# Patient Record
Sex: MALE | Race: WHITE | Employment: UNEMPLOYED | URBAN - METROPOLITAN AREA
[De-identification: names, ages, dates, MRNs, and addresses within clinical notes are randomized per-mention and may not be internally consistent; named-entity substitution may affect disease eponyms.]

---

## 2022-11-20 ENCOUNTER — HOSPITAL ENCOUNTER (INPATIENT)
Age: 77
LOS: 2 days | Discharge: HOME | DRG: 726 | End: 2022-11-22
Attending: INTERNAL MEDICINE | Admitting: INTERNAL MEDICINE
Payer: MEDICARE

## 2022-11-20 ENCOUNTER — APPOINTMENT (OUTPATIENT)
Dept: GENERAL RADIOLOGY | Age: 77
DRG: 726 | End: 2022-11-20
Payer: MEDICARE

## 2022-11-20 DIAGNOSIS — E87.1 HYPONATREMIA: ICD-10-CM

## 2022-11-20 DIAGNOSIS — N17.9 AKI (ACUTE KIDNEY INJURY) (HCC): Primary | ICD-10-CM

## 2022-11-20 DIAGNOSIS — R33.8 URINARY RETENTION DUE TO BENIGN PROSTATIC HYPERPLASIA: ICD-10-CM

## 2022-11-20 DIAGNOSIS — D72.829 LEUKOCYTOSIS, UNSPECIFIED TYPE: ICD-10-CM

## 2022-11-20 DIAGNOSIS — N40.1 URINARY RETENTION DUE TO BENIGN PROSTATIC HYPERPLASIA: ICD-10-CM

## 2022-11-20 DIAGNOSIS — N30.01 ACUTE CYSTITIS WITH HEMATURIA: ICD-10-CM

## 2022-11-20 PROBLEM — N39.0 UTI (URINARY TRACT INFECTION): Status: ACTIVE | Noted: 2022-11-20

## 2022-11-20 LAB
ANION GAP SERPL CALCULATED.3IONS-SCNC: 12 MMOL/L (ref 3–16)
BACTERIA: ABNORMAL /HPF
BASOPHILS ABSOLUTE: 0 K/UL (ref 0–0.2)
BASOPHILS RELATIVE PERCENT: 0 %
BILIRUBIN URINE: NEGATIVE
BLOOD, URINE: ABNORMAL
BUN BLDV-MCNC: 41 MG/DL (ref 7–20)
CALCIUM SERPL-MCNC: 8.7 MG/DL (ref 8.3–10.6)
CHLORIDE BLD-SCNC: 94 MMOL/L (ref 99–110)
CHLORIDE URINE RANDOM: 33 MMOL/L
CLARITY: CLEAR
CO2: 22 MMOL/L (ref 21–32)
COLOR: YELLOW
CREAT SERPL-MCNC: 2.7 MG/DL (ref 0.8–1.3)
EOSINOPHILS ABSOLUTE: 0 K/UL (ref 0–0.6)
EOSINOPHILS RELATIVE PERCENT: 0 %
EPITHELIAL CELLS, UA: 2 /HPF (ref 0–5)
GFR SERPL CREATININE-BSD FRML MDRD: 23 ML/MIN/{1.73_M2}
GLUCOSE BLD-MCNC: 101 MG/DL (ref 70–99)
GLUCOSE URINE: NEGATIVE MG/DL
HCT VFR BLD CALC: 37 % (ref 40.5–52.5)
HEMOGLOBIN: 12.1 G/DL (ref 13.5–17.5)
HYALINE CASTS: 1 /LPF (ref 0–8)
KETONES, URINE: NEGATIVE MG/DL
LEUKOCYTE ESTERASE, URINE: ABNORMAL
LYMPHOCYTES ABSOLUTE: 2 K/UL (ref 1–5.1)
LYMPHOCYTES RELATIVE PERCENT: 13 %
MCH RBC QN AUTO: 29 PG (ref 26–34)
MCHC RBC AUTO-ENTMCNC: 32.9 G/DL (ref 31–36)
MCV RBC AUTO: 88.2 FL (ref 80–100)
MICROSCOPIC EXAMINATION: YES
MONOCYTES ABSOLUTE: 1.2 K/UL (ref 0–1.3)
MONOCYTES RELATIVE PERCENT: 8 %
NEUTROPHILS ABSOLUTE: 12.2 K/UL (ref 1.7–7.7)
NEUTROPHILS RELATIVE PERCENT: 79 %
NITRITE, URINE: NEGATIVE
PDW BLD-RTO: 14.6 % (ref 12.4–15.4)
PH UA: 5.5 (ref 5–8)
PLATELET # BLD: 324 K/UL (ref 135–450)
PMV BLD AUTO: 7.6 FL (ref 5–10.5)
POTASSIUM REFLEX MAGNESIUM: 4.3 MMOL/L (ref 3.5–5.1)
POTASSIUM, UR: 32.8 MMOL/L
PROTEIN UA: NEGATIVE MG/DL
RBC # BLD: 4.19 M/UL (ref 4.2–5.9)
RBC # BLD: NORMAL 10*6/UL
RBC UA: 14 /HPF (ref 0–4)
SODIUM BLD-SCNC: 128 MMOL/L (ref 136–145)
SODIUM URINE: 44 MMOL/L
SPECIFIC GRAVITY UA: 1.01 (ref 1–1.03)
URINE REFLEX TO CULTURE: YES
URINE TYPE: ABNORMAL
UROBILINOGEN, URINE: 0.2 E.U./DL
WBC # BLD: 15.5 K/UL (ref 4–11)
WBC UA: 15 /HPF (ref 0–5)

## 2022-11-20 PROCEDURE — 6360000002 HC RX W HCPCS: Performed by: INTERNAL MEDICINE

## 2022-11-20 PROCEDURE — 87040 BLOOD CULTURE FOR BACTERIA: CPT

## 2022-11-20 PROCEDURE — 82436 ASSAY OF URINE CHLORIDE: CPT

## 2022-11-20 PROCEDURE — 83935 ASSAY OF URINE OSMOLALITY: CPT

## 2022-11-20 PROCEDURE — 51798 US URINE CAPACITY MEASURE: CPT

## 2022-11-20 PROCEDURE — 80048 BASIC METABOLIC PNL TOTAL CA: CPT

## 2022-11-20 PROCEDURE — 74018 RADEX ABDOMEN 1 VIEW: CPT

## 2022-11-20 PROCEDURE — 99285 EMERGENCY DEPT VISIT HI MDM: CPT

## 2022-11-20 PROCEDURE — 6360000002 HC RX W HCPCS: Performed by: PHYSICIAN ASSISTANT

## 2022-11-20 PROCEDURE — 2580000003 HC RX 258: Performed by: PHYSICIAN ASSISTANT

## 2022-11-20 PROCEDURE — 84133 ASSAY OF URINE POTASSIUM: CPT

## 2022-11-20 PROCEDURE — 36415 COLL VENOUS BLD VENIPUNCTURE: CPT

## 2022-11-20 PROCEDURE — 87086 URINE CULTURE/COLONY COUNT: CPT

## 2022-11-20 PROCEDURE — 81001 URINALYSIS AUTO W/SCOPE: CPT

## 2022-11-20 PROCEDURE — 84300 ASSAY OF URINE SODIUM: CPT

## 2022-11-20 PROCEDURE — 85025 COMPLETE CBC W/AUTO DIFF WBC: CPT

## 2022-11-20 PROCEDURE — 51702 INSERT TEMP BLADDER CATH: CPT

## 2022-11-20 PROCEDURE — 1200000000 HC SEMI PRIVATE

## 2022-11-20 PROCEDURE — 6370000000 HC RX 637 (ALT 250 FOR IP): Performed by: INTERNAL MEDICINE

## 2022-11-20 RX ORDER — LISINOPRIL 10 MG/1
10 TABLET ORAL EVERY EVENING
COMMUNITY

## 2022-11-20 RX ORDER — TAMSULOSIN HYDROCHLORIDE 0.4 MG/1
0.4 CAPSULE ORAL EVERY EVENING
COMMUNITY

## 2022-11-20 RX ORDER — ASPIRIN 81 MG/1
81 TABLET ORAL NIGHTLY
Status: DISCONTINUED | OUTPATIENT
Start: 2022-11-20 | End: 2022-11-22 | Stop reason: HOSPADM

## 2022-11-20 RX ORDER — ATORVASTATIN CALCIUM 40 MG/1
40 TABLET, FILM COATED ORAL EVERY EVENING
COMMUNITY

## 2022-11-20 RX ORDER — TIZANIDINE 4 MG/1
4 TABLET ORAL EVERY 8 HOURS PRN
COMMUNITY

## 2022-11-20 RX ORDER — GABAPENTIN 100 MG/1
100 CAPSULE ORAL 2 TIMES DAILY
COMMUNITY

## 2022-11-20 RX ORDER — TAMSULOSIN HYDROCHLORIDE 0.4 MG/1
0.4 CAPSULE ORAL NIGHTLY
Status: DISCONTINUED | OUTPATIENT
Start: 2022-11-21 | End: 2022-11-22 | Stop reason: HOSPADM

## 2022-11-20 RX ORDER — ACETAMINOPHEN 325 MG/1
650 TABLET ORAL EVERY 6 HOURS PRN
Status: DISCONTINUED | OUTPATIENT
Start: 2022-11-20 | End: 2022-11-22 | Stop reason: HOSPADM

## 2022-11-20 RX ORDER — GABAPENTIN 100 MG/1
100 CAPSULE ORAL 2 TIMES DAILY
Status: DISCONTINUED | OUTPATIENT
Start: 2022-11-20 | End: 2022-11-22 | Stop reason: HOSPADM

## 2022-11-20 RX ORDER — ONDANSETRON 2 MG/ML
4 INJECTION INTRAMUSCULAR; INTRAVENOUS EVERY 6 HOURS PRN
Status: DISCONTINUED | OUTPATIENT
Start: 2022-11-20 | End: 2022-11-22 | Stop reason: HOSPADM

## 2022-11-20 RX ORDER — CAL/D3/MAG11/ZINC/COP/MANG/BOR 600 MG-800
1 TABLET ORAL DAILY
COMMUNITY

## 2022-11-20 RX ORDER — OMEPRAZOLE 40 MG/1
40 CAPSULE, DELAYED RELEASE ORAL DAILY
COMMUNITY

## 2022-11-20 RX ORDER — SODIUM CHLORIDE 0.9 % (FLUSH) 0.9 %
5-40 SYRINGE (ML) INJECTION EVERY 12 HOURS SCHEDULED
Status: DISCONTINUED | OUTPATIENT
Start: 2022-11-20 | End: 2022-11-22 | Stop reason: HOSPADM

## 2022-11-20 RX ORDER — SODIUM CHLORIDE 0.9 % (FLUSH) 0.9 %
5-40 SYRINGE (ML) INJECTION PRN
Status: DISCONTINUED | OUTPATIENT
Start: 2022-11-20 | End: 2022-11-22 | Stop reason: HOSPADM

## 2022-11-20 RX ORDER — TROSPIUM CHLORIDE 20 MG/1
20 TABLET, FILM COATED ORAL NIGHTLY
Status: DISCONTINUED | OUTPATIENT
Start: 2022-11-21 | End: 2022-11-21

## 2022-11-20 RX ORDER — SODIUM CHLORIDE 9 MG/ML
INJECTION, SOLUTION INTRAVENOUS PRN
Status: DISCONTINUED | OUTPATIENT
Start: 2022-11-20 | End: 2022-11-22 | Stop reason: HOSPADM

## 2022-11-20 RX ORDER — ENOXAPARIN SODIUM 100 MG/ML
30 INJECTION SUBCUTANEOUS NIGHTLY
Status: DISCONTINUED | OUTPATIENT
Start: 2022-11-20 | End: 2022-11-21

## 2022-11-20 RX ORDER — SODIUM CHLORIDE 9 MG/ML
INJECTION, SOLUTION INTRAVENOUS CONTINUOUS
Status: DISCONTINUED | OUTPATIENT
Start: 2022-11-20 | End: 2022-11-22 | Stop reason: HOSPADM

## 2022-11-20 RX ORDER — PANTOPRAZOLE SODIUM 40 MG/1
40 TABLET, DELAYED RELEASE ORAL
Status: DISCONTINUED | OUTPATIENT
Start: 2022-11-21 | End: 2022-11-22 | Stop reason: HOSPADM

## 2022-11-20 RX ORDER — SOLIFENACIN SUCCINATE 5 MG/1
5 TABLET, FILM COATED ORAL EVERY EVENING
COMMUNITY

## 2022-11-20 RX ORDER — POLYETHYLENE GLYCOL 3350 17 G/17G
17 POWDER, FOR SOLUTION ORAL DAILY PRN
Status: DISCONTINUED | OUTPATIENT
Start: 2022-11-20 | End: 2022-11-22 | Stop reason: HOSPADM

## 2022-11-20 RX ORDER — ACETAMINOPHEN 650 MG/1
650 SUPPOSITORY RECTAL EVERY 6 HOURS PRN
Status: DISCONTINUED | OUTPATIENT
Start: 2022-11-20 | End: 2022-11-22 | Stop reason: HOSPADM

## 2022-11-20 RX ORDER — ASPIRIN 81 MG/1
81 TABLET ORAL EVERY EVENING
COMMUNITY

## 2022-11-20 RX ORDER — TROSPIUM CHLORIDE 20 MG/1
20 TABLET, FILM COATED ORAL
Status: DISCONTINUED | OUTPATIENT
Start: 2022-11-20 | End: 2022-11-20 | Stop reason: DRUGHIGH

## 2022-11-20 RX ORDER — POLYETHYLENE GLYCOL 3350 17 G/17G
17 POWDER, FOR SOLUTION ORAL 2 TIMES DAILY
Status: DISCONTINUED | OUTPATIENT
Start: 2022-11-20 | End: 2022-11-22 | Stop reason: HOSPADM

## 2022-11-20 RX ORDER — METOPROLOL TARTRATE 50 MG/1
50 TABLET, FILM COATED ORAL 2 TIMES DAILY
COMMUNITY

## 2022-11-20 RX ORDER — ATORVASTATIN CALCIUM 40 MG/1
40 TABLET, FILM COATED ORAL NIGHTLY
Status: DISCONTINUED | OUTPATIENT
Start: 2022-11-20 | End: 2022-11-22 | Stop reason: HOSPADM

## 2022-11-20 RX ORDER — METOPROLOL TARTRATE 50 MG/1
50 TABLET, FILM COATED ORAL 2 TIMES DAILY
Status: DISCONTINUED | OUTPATIENT
Start: 2022-11-20 | End: 2022-11-22 | Stop reason: HOSPADM

## 2022-11-20 RX ORDER — ONDANSETRON 4 MG/1
4 TABLET, ORALLY DISINTEGRATING ORAL EVERY 8 HOURS PRN
Status: DISCONTINUED | OUTPATIENT
Start: 2022-11-20 | End: 2022-11-22 | Stop reason: HOSPADM

## 2022-11-20 RX ORDER — 0.9 % SODIUM CHLORIDE 0.9 %
1000 INTRAVENOUS SOLUTION INTRAVENOUS ONCE
Status: COMPLETED | OUTPATIENT
Start: 2022-11-20 | End: 2022-11-20

## 2022-11-20 RX ADMIN — POLYETHYLENE GLYCOL 3350 17 G: 17 POWDER, FOR SOLUTION ORAL at 20:06

## 2022-11-20 RX ADMIN — METOPROLOL TARTRATE 50 MG: 50 TABLET, FILM COATED ORAL at 20:06

## 2022-11-20 RX ADMIN — ASPIRIN 81 MG: 81 TABLET, COATED ORAL at 20:06

## 2022-11-20 RX ADMIN — ENOXAPARIN SODIUM 30 MG: 100 INJECTION SUBCUTANEOUS at 20:06

## 2022-11-20 RX ADMIN — GABAPENTIN 100 MG: 100 CAPSULE ORAL at 20:06

## 2022-11-20 RX ADMIN — ATORVASTATIN CALCIUM 40 MG: 40 TABLET, FILM COATED ORAL at 20:06

## 2022-11-20 RX ADMIN — CEFTRIAXONE 1000 MG: 1 INJECTION, POWDER, FOR SOLUTION INTRAMUSCULAR; INTRAVENOUS at 14:52

## 2022-11-20 RX ADMIN — SODIUM CHLORIDE 1000 ML: 9 INJECTION, SOLUTION INTRAVENOUS at 14:53

## 2022-11-20 ASSESSMENT — PAIN - FUNCTIONAL ASSESSMENT: PAIN_FUNCTIONAL_ASSESSMENT: 0-10

## 2022-11-20 ASSESSMENT — PAIN SCALES - GENERAL: PAINLEVEL_OUTOF10: 3

## 2022-11-20 NOTE — PROGRESS NOTES
Clinical Pharmacy Note  Renal Dose Adjustment    Viola Grace is receiving trospium. This medication is renally eliminated. Based on the patient's Estimated Creatinine Clearance of 21 mL/min (A) (based on SCr of 2.7 mg/dL (H)), the dose has been adjusted to trospium 20 mg nightly per protocol. Pharmacy will continue to monitor and adjust dose as needed for changes in renal function.

## 2022-11-20 NOTE — PROGRESS NOTES
Pharmacy Medication Reconciliation Note     List of medications Kylah John is currently taking is complete. Source of information:   1. Conversation with patient and spouse at bedside  2. EMR    Notes regarding home medications:   1.  Patient reports taking all AM meds and even QPM Vesicare and Flomax PTA in the ED    Patient denies taking any additional OTC or herbal medications    Ned Torres, Pharmacy Intern  11/20/2022  3:37 PM

## 2022-11-20 NOTE — H&P
Hospital Medicine History & Physical      PCP: Elvie Mckenna    Date of Admission: 11/20/2022    Date of Service: Pt seen/examined on 11/20/2022   and Admitted to Inpatient with expected LOS greater than two midnights due to medical therapy. Chief Complaint: Urinary retention      History Of Present Illness:      76 y.o. male who presented to [facility] [place] with urinary retention. Patient states he has had difficulty urinating for the last several days and today he had abdominal discomfort and constipation. Pain was mostly suprapubic and cramping. Patient is on medical therapy for BPH. In the ER did bladder scan was found to have over 900 mL of urine and he required placement of Flores catheter. He is feeling much better after Flores placement. He denies fevers or chills. He states that he has been constipated several days ago and it has not really improved much. He feels it was secondary to antibiotics which she received for a upper respiratory tract infection    Past Medical History:      Hyperlipidemia  Peripheral neuropathy  BPH  Gastroesophageal reflux disease    Past Surgical History:      History reviewed. No pertinent surgical history. Medications Prior to Admission:      Prior to Admission medications    Medication Sig Start Date End Date Taking? Authorizing Provider   atorvastatin (LIPITOR) 40 MG tablet Take 40 mg by mouth every evening   Yes Historical Provider, MD   gabapentin (NEURONTIN) 100 MG capsule Take 100 mg by mouth in the morning and at bedtime.    Yes Historical Provider, MD   lisinopril (PRINIVIL;ZESTRIL) 10 MG tablet Take 10 mg by mouth every evening   Yes Historical Provider, MD   metoprolol tartrate (LOPRESSOR) 50 MG tablet Take 50 mg by mouth 2 times daily   Yes Historical Provider, MD   omeprazole (PRILOSEC) 40 MG delayed release capsule Take 40 mg by mouth daily   Yes Historical Provider, MD   solifenacin (VESICARE) 5 MG tablet Take 5 mg by mouth every evening   Yes Historical Provider, MD   tamsulosin (FLOMAX) 0.4 MG capsule Take 0.4 mg by mouth every evening   Yes Historical Provider, MD   tiZANidine (ZANAFLEX) 4 MG tablet Take 4 mg by mouth every 8 hours as needed   Yes Historical Provider, MD   Caltrate 600+D Plus Minerals (CALTRATE) 600-800 MG-UNIT TABS tablet Take 1 tablet by mouth daily   Yes Historical Provider, MD   aspirin 81 MG EC tablet Take 81 mg by mouth every evening   Yes Historical Provider, MD       Allergies:  Patient has no known allergies. Social History:      The patient currently lives with his wife    TOBACCO:   reports that he has never smoked. He has never used smokeless tobacco.  ETOH:   reports that he does not currently use alcohol. E-cigarette/Vaping       Questions Responses    E-cigarette/Vaping Use     Start Date     Passive Exposure     Quit Date     Counseling Given     Comments               Family History:       Reviewed and negative in regards to presenting illness/complaint. History reviewed. No pertinent family history. REVIEW OF SYSTEMS COMPLETED:   Pertinent positives as noted in the HPI. All other systems reviewed and negative. PHYSICAL EXAM PERFORMED:    BP (!) 143/74   Pulse 57   Temp 98.7 °F (37.1 °C)   Resp 16   Ht 5' 8\" (1.727 m)   Wt 168 lb 6.9 oz (76.4 kg)   SpO2 94%   BMI 25.61 kg/m²     General appearance:  No apparent distress, appears stated age and cooperative. HEENT:  Normal cephalic, atraumatic without obvious deformity. Pupils equal, round, and reactive to light. Extra ocular muscles intact. Conjunctivae/corneas clear. Neck: Supple, with full range of motion. No jugular venous distention. Trachea midline. Respiratory:  Normal respiratory effort. Clear to auscultation, bilaterally without Rales/Wheezes/Rhonchi. Cardiovascular:  Regular rate and rhythm with normal S1/S2 without murmurs, rubs or gallops.   Abdomen: Soft, non-tender, non-distended with normal bowel sounds. Musculoskeletal:  No clubbing, cyanosis or edema bilaterally. Full range of motion without deformity. Skin: Skin color, texture, turgor normal.  No rashes or lesions. Neurologic:  Neurovascularly intact without any focal sensory/motor deficits. Cranial nerves: II-XII intact, grossly non-focal.  Psychiatric:  Alert and oriented, thought content appropriate, normal insight  Capillary Refill: Brisk,3 seconds, normal  Peripheral Pulses: +2 palpable, equal bilaterally       Labs:     Recent Labs     11/20/22  1304   WBC 15.5*   HGB 12.1*   HCT 37.0*        Recent Labs     11/20/22  1304   *   K 4.3   CL 94*   CO2 22   BUN 41*   CREATININE 2.7*   CALCIUM 8.7     No results for input(s): AST, ALT, BILIDIR, BILITOT, ALKPHOS in the last 72 hours. No results for input(s): INR in the last 72 hours. No results for input(s): Peng Arlene in the last 72 hours. Urinalysis:      Lab Results   Component Value Date/Time    NITRU Negative 11/20/2022 01:04 PM    WBCUA 15 11/20/2022 01:04 PM    BACTERIA None Seen 11/20/2022 01:04 PM    RBCUA 14 11/20/2022 01:04 PM    BLOODU MODERATE 11/20/2022 01:04 PM    SPECGRAV 1.012 11/20/2022 01:04 PM    GLUCOSEU Negative 11/20/2022 01:04 PM       Radiology:       XR ABDOMEN (KUB) (SINGLE AP VIEW)   Final Result   Nonobstructive bowel gas pattern without significant stool volume.              Consults:    IP CONSULT TO HOSPITALIST  IP CONSULT TO UROLOGY    ASSESSMENT:    Acute urinary retention  Urinary tract obstruction due to BPH status post Flores placed  Urinary tract infection  Acute renal failure likely obstructive  Constipation  Hypertension  GERD    PLAN:    Flores catheter placed  Treat with Flomax  Gentle IV fluids normal saline at 75 MLS per hour  ACE inhibitor held  Rocephin for UTI  Neurology consult  Treat constipation with MiraLAX 17 g twice daily and Senokot  Continue Prilosec for GERD  Continue Lopressor for hypertension  Urology consult  Prosper Echols  DVT Prophylaxis: Lovenox  Diet: ADULT DIET; Regular  Code Status: Full Code    PT/OT Eval Status:     Dispo -2 to 3 days       Chin Higgins MD    Thank you Rodney Downey for the opportunity to be involved in this patient's care. If you have any questions or concerns please feel free to contact me at [phone].

## 2022-11-20 NOTE — ED PROVIDER NOTES
[facility]        Pt Name: Lisa Madison  MRN: [id number]  [place] 1945  Date of evaluation: 11/20/2022  Provider: Mackenzie Robledo PA-C  PCP: Elvie Mcknena  Note Started: 12:50 PM EST 11/20/2022        PK. I have evaluated this patient. My supervising physician was available for consultation. CHIEF COMPLAINT       Chief Complaint   Patient presents with    Urinary Retention     Pt states that since Saturday he has been having trouble urinating he only dribbles and that not normal for him    Abdominal Pain     Pt states that he has been having lower abd pain with nausea        HISTORY OF PRESENT ILLNESS   (Location, Timing/Onset, Context/Setting, Quality, Duration, Modifying Factors, Severity, Associated Signs and Symptoms)  Note limiting factors. Chief Complaint: Dribbling unable to urinate    Lisa Madison is a 76 y.o. male who presents to the emergency department with wife. Patient contacted  Dr. Erica Fontana office. Recommend ED evaluation. The patient reports over the past 2 to 3 days urine dribbling and not actually voiding. Suprapubic discomfort. Concern constipation and lower abdominal pain. Patient reports not having it previously. Patient with BPH. Patient is on Flomax 0.4 nightly. Will advance to a twice daily dosing. Also on Vesicare 5 mg. Nursing Notes were all reviewed and agreed with or any disagreements were addressed in the HPI. REVIEW OF SYSTEMS    (2-9 systems for level 4, 10 or more for level 5)     Review of Systems    Positives and Pertinent negatives as per HPI. Except as noted above in the ROS, all other systems were reviewed and negative. PAST MEDICAL HISTORY   History reviewed. No pertinent past medical history. SURGICAL HISTORY   History reviewed. No pertinent surgical history.       CURRENTMEDICATIONS       Previous Medications    No medications on file ALLERGIES     Patient has no known allergies. FAMILYHISTORY     History reviewed. No pertinent family history. SOCIAL HISTORY       Social History     Tobacco Use    Smoking status: Never    Smokeless tobacco: Never   Substance Use Topics    Alcohol use: Not Currently    Drug use: Never       SCREENINGS             PHYSICAL EXAM    (up to 7 for level 4, 8 or more for level 5)     ED Triage Vitals   BP Temp Temp src Heart Rate Resp SpO2 Height Weight   11/20/22 1233 11/20/22 1237 -- 11/20/22 1233 11/20/22 1237 11/20/22 1237 11/20/22 1233 11/20/22 1233   (!) 97/55 98.7 °F (37.1 °C)  64 18 94 % 5' 8\" (1.727 m) 168 lb 6.9 oz (76.4 kg)       Physical Exam  Vitals and nursing note reviewed. Constitutional:       Appearance: Normal appearance. He is well-developed and normal weight. Comments: Patient mildly hypotensive of with BP 98/60. Could be related to the abdominal pain due to urinary outlet obstruction. Will reevaluate   HENT:      Head: Normocephalic and atraumatic. Right Ear: External ear normal.      Left Ear: External ear normal.   Eyes:      General: No scleral icterus. Right eye: No discharge. Left eye: No discharge. Conjunctiva/sclera: Conjunctivae normal.   Cardiovascular:      Rate and Rhythm: Normal rate and regular rhythm. Heart sounds: Normal heart sounds. Pulmonary:      Effort: Pulmonary effort is normal.      Breath sounds: Normal breath sounds. Abdominal:      General: Abdomen is flat. Bowel sounds are normal.      Palpations: Abdomen is soft. Tenderness: There is abdominal tenderness. Comments: The patient suprapubic tenderness. Bladder scan revealing excess 900 mL urine. Musculoskeletal:         General: Normal range of motion. Cervical back: Normal range of motion and neck supple. Right lower leg: No edema. Left lower leg: No edema. Skin:     General: Skin is warm and dry.    Neurological:      General: No focal deficit present. Mental Status: He is alert and oriented to person, place, and time. Mental status is at baseline. Psychiatric:         Mood and Affect: Mood normal.         Behavior: Behavior normal.         Thought Content: Thought content normal.         Judgment: Judgment normal.       DIAGNOSTIC RESULTS   LABS:    Labs Reviewed   URINALYSIS WITH REFLEX TO CULTURE - Abnormal; Notable for the following components:       Result Value    Blood, Urine MODERATE (*)     Leukocyte Esterase, Urine SMALL (*)     All other components within normal limits   CBC WITH AUTO DIFFERENTIAL - Abnormal; Notable for the following components:    WBC 15.5 (*)     RBC 4.19 (*)     Hemoglobin 12.1 (*)     Hematocrit 37.0 (*)     All other components within normal limits   BASIC METABOLIC PANEL W/ REFLEX TO MG FOR LOW K - Abnormal; Notable for the following components:    Sodium 128 (*)     Chloride 94 (*)     Glucose 101 (*)     BUN 41 (*)     Creatinine 2.7 (*)     Est, Glom Filt Rate 23 (*)     All other components within normal limits   MICROSCOPIC URINALYSIS - Abnormal; Notable for the following components:    WBC, UA 15 (*)     RBC, UA 14 (*)     All other components within normal limits   CULTURE, URINE   CULTURE, BLOOD 1   CULTURE, BLOOD 2   OSMOLALITY, URINE   OSMOLALITY   ELECTROLYTES URINE RANDOM       When ordered only abnormal lab results are displayed. All other labs were within normal range or not returned as of this dictation. EKG: When ordered, EKG's are interpreted by the Emergency Department Physician in the absence of a cardiologist.  Please see their note for interpretation of EKG.     RADIOLOGY:   Non-plain film images such as CT, Ultrasound and MRI are read by the radiologist. Plain radiographic images are visualized and preliminarily interpreted by the ED Provider with the below findings:        Interpretation per the Radiologist below, if available at the time of this note:    XR ABDOMEN (KUB) (SINGLE AP VIEW)   Final Result   Nonobstructive bowel gas pattern without significant stool volume. No results found. PROCEDURES   Unless otherwise noted below, none     Procedures    CRITICAL CARE TIME       CONSULTS:  IP CONSULT TO HOSPITALIST      EMERGENCY DEPARTMENT COURSE and DIFFERENTIAL DIAGNOSIS/MDM:   Vitals:    Vitals:    11/20/22 1233 11/20/22 1237 11/20/22 1300 11/20/22 1315   BP: (!) 97/55 98/60 102/62 110/67   Pulse: 64  60 61   Resp:  18 18 17   Temp:  98.7 °F (37.1 °C)     SpO2:  94%     Weight: 168 lb 6.9 oz (76.4 kg)      Height: 5' 8\" (1.727 m)          Patient was given the following medications:  Medications   cefTRIAXone (ROCEPHIN) 1,000 mg in dextrose 5 % 50 mL IVPB mini-bag (has no administration in time range)   0.9 % sodium chloride bolus (has no administration in time range)         Is this patient to be included in the SEP-1 Core Measure due to severe sepsis or septic shock? No   Exclusion criteria - the patient is NOT to be included for SEP-1 Core Measure due to: Infection is not suspected    This patient presented with several day history of urinary dribbling and incomplete bladder emptying. Bladder scan excess 900 mL urine. Flores catheter placed with bladder decompression. Concern constipation. KUB x-ray negative for constipation. Laboratory evaluation shows hematuria on microscopic with small leukocyte and microscopic showing 15 WBC and 14 RBC. No bacteria seen. Urine culture pending. Blood cultures pending. I did start Rocephin 1 g IVPB. Patient with a new CARMITA. Patient last renal function study 4/4/2022 BUN 18, creatinine 1.12 and GFR 71. Today the patient's BUN 41, creatinine 2.7 and GFR 23. Sodium 128, WBC 15.5. I placed order for IV placement, NaCl 1 L over 2 hours, urine osmolality, sodium and electrolytes are pending. Serum osmolality pending at this time. Patient need admission for correction of CARMITA. The patient's urologist is Dr. Gerry Bennett. He does have an indwelling Flores catheter with bag attachment. I did send [employer] note to hospitalist at 2:42 PM.    FINAL IMPRESSION      1. CARMITA (acute kidney injury) ([facility] [address]    2. Urinary retention due to benign prostatic hyperplasia    3. Acute cystitis with hematuria    4. Leukocytosis, unspecified type    5. Hyponatremia          DISPOSITION/PLAN   DISPOSITION Decision To Admit 11/20/2022 02:34:41 PM      PATIENT REFERRED TO:  No follow-up provider specified. DISCHARGE MEDICATIONS:  New Prescriptions    No medications on file       DISCONTINUED MEDICATIONS:  Discontinued Medications    No medications on file              (Please note that portions of this note were completed with a voice recognition program.  Efforts were made to edit the dictations but occasionally words are mis-transcribed. )    Selma Portillo PA-C (electronically signed)            Selma Portillo PA-C  11/20/22 2915

## 2022-11-20 NOTE — ED NOTES
Pt states that he has been having issues urinating issues since yesterday and is also having lower abd pain. Pt states that he has been having pain while urinating also .  Pt is alert and oriented with wife at bedside        Fred Shetty RN  11/20/22 6120

## 2022-11-20 NOTE — PROGRESS NOTES
Pt. Admitted to the floor. VSS stable. Pt. Pt. Denies any needs. Call light in reach, bed in lowest position.

## 2022-11-21 LAB
ANION GAP SERPL CALCULATED.3IONS-SCNC: 15 MMOL/L (ref 3–16)
BASOPHILS ABSOLUTE: 0 K/UL (ref 0–0.2)
BASOPHILS RELATIVE PERCENT: 0 %
BUN BLDV-MCNC: 19 MG/DL (ref 7–20)
CALCIUM SERPL-MCNC: 9.6 MG/DL (ref 8.3–10.6)
CHLORIDE BLD-SCNC: 100 MMOL/L (ref 99–110)
CO2: 19 MMOL/L (ref 21–32)
CREAT SERPL-MCNC: 0.7 MG/DL (ref 0.8–1.3)
EOSINOPHILS ABSOLUTE: 0.6 K/UL (ref 0–0.6)
EOSINOPHILS RELATIVE PERCENT: 6 %
GFR SERPL CREATININE-BSD FRML MDRD: >60 ML/MIN/{1.73_M2}
GLUCOSE BLD-MCNC: 328 MG/DL (ref 70–99)
HCT VFR BLD CALC: 36.1 % (ref 40.5–52.5)
HEMOGLOBIN: 12 G/DL (ref 13.5–17.5)
LYMPHOCYTES ABSOLUTE: 2 K/UL (ref 1–5.1)
LYMPHOCYTES RELATIVE PERCENT: 20 %
MCH RBC QN AUTO: 29.2 PG (ref 26–34)
MCHC RBC AUTO-ENTMCNC: 33.2 G/DL (ref 31–36)
MCV RBC AUTO: 88 FL (ref 80–100)
MONOCYTES ABSOLUTE: 1 K/UL (ref 0–1.3)
MONOCYTES RELATIVE PERCENT: 10 %
NEUTROPHILS ABSOLUTE: 6.3 K/UL (ref 1.7–7.7)
NEUTROPHILS RELATIVE PERCENT: 64 %
OSMOLALITY URINE: 363 MOSM/KG (ref 390–1070)
OSMOLALITY: 304 MOSM/KG (ref 280–301)
PDW BLD-RTO: 14.8 % (ref 12.4–15.4)
PLATELET # BLD: 307 K/UL (ref 135–450)
PLATELET SLIDE REVIEW: ADEQUATE
PMV BLD AUTO: 8.1 FL (ref 5–10.5)
POTASSIUM REFLEX MAGNESIUM: 4.9 MMOL/L (ref 3.5–5.1)
RBC # BLD: 4.1 M/UL (ref 4.2–5.9)
SLIDE REVIEW: ABNORMAL
SODIUM BLD-SCNC: 134 MMOL/L (ref 136–145)
WBC # BLD: 9.9 K/UL (ref 4–11)

## 2022-11-21 PROCEDURE — 2580000003 HC RX 258: Performed by: INTERNAL MEDICINE

## 2022-11-21 PROCEDURE — 6360000002 HC RX W HCPCS: Performed by: INTERNAL MEDICINE

## 2022-11-21 PROCEDURE — 1200000000 HC SEMI PRIVATE

## 2022-11-21 PROCEDURE — 36415 COLL VENOUS BLD VENIPUNCTURE: CPT

## 2022-11-21 PROCEDURE — 6370000000 HC RX 637 (ALT 250 FOR IP): Performed by: INTERNAL MEDICINE

## 2022-11-21 PROCEDURE — 6370000000 HC RX 637 (ALT 250 FOR IP): Performed by: STUDENT IN AN ORGANIZED HEALTH CARE EDUCATION/TRAINING PROGRAM

## 2022-11-21 PROCEDURE — 85025 COMPLETE CBC W/AUTO DIFF WBC: CPT

## 2022-11-21 PROCEDURE — 80048 BASIC METABOLIC PNL TOTAL CA: CPT

## 2022-11-21 PROCEDURE — 83930 ASSAY OF BLOOD OSMOLALITY: CPT

## 2022-11-21 PROCEDURE — 6360000002 HC RX W HCPCS: Performed by: STUDENT IN AN ORGANIZED HEALTH CARE EDUCATION/TRAINING PROGRAM

## 2022-11-21 RX ORDER — TROSPIUM CHLORIDE 20 MG/1
20 TABLET, FILM COATED ORAL
Status: DISCONTINUED | OUTPATIENT
Start: 2022-11-21 | End: 2022-11-22 | Stop reason: HOSPADM

## 2022-11-21 RX ORDER — DOCUSATE SODIUM 100 MG/1
200 CAPSULE, LIQUID FILLED ORAL 2 TIMES DAILY
Status: DISCONTINUED | OUTPATIENT
Start: 2022-11-21 | End: 2022-11-22 | Stop reason: HOSPADM

## 2022-11-21 RX ORDER — ENOXAPARIN SODIUM 100 MG/ML
40 INJECTION SUBCUTANEOUS NIGHTLY
Status: DISCONTINUED | OUTPATIENT
Start: 2022-11-21 | End: 2022-11-22 | Stop reason: HOSPADM

## 2022-11-21 RX ADMIN — CEFTRIAXONE 1000 MG: 1 INJECTION, POWDER, FOR SOLUTION INTRAMUSCULAR; INTRAVENOUS at 15:19

## 2022-11-21 RX ADMIN — POLYETHYLENE GLYCOL 3350 17 G: 17 POWDER, FOR SOLUTION ORAL at 10:01

## 2022-11-21 RX ADMIN — METOPROLOL TARTRATE 50 MG: 50 TABLET, FILM COATED ORAL at 10:00

## 2022-11-21 RX ADMIN — METOPROLOL TARTRATE 50 MG: 50 TABLET, FILM COATED ORAL at 21:25

## 2022-11-21 RX ADMIN — SODIUM CHLORIDE: 9 INJECTION, SOLUTION INTRAVENOUS at 17:50

## 2022-11-21 RX ADMIN — GABAPENTIN 100 MG: 100 CAPSULE ORAL at 21:25

## 2022-11-21 RX ADMIN — TAMSULOSIN HYDROCHLORIDE 0.4 MG: 0.4 CAPSULE ORAL at 21:25

## 2022-11-21 RX ADMIN — ASPIRIN 81 MG: 81 TABLET, COATED ORAL at 21:25

## 2022-11-21 RX ADMIN — POLYETHYLENE GLYCOL 3350 17 G: 17 POWDER, FOR SOLUTION ORAL at 21:24

## 2022-11-21 RX ADMIN — TROSPIUM CHLORIDE 20 MG: 20 TABLET, FILM COATED ORAL at 15:20

## 2022-11-21 RX ADMIN — DOCUSATE SODIUM 200 MG: 100 CAPSULE, LIQUID FILLED ORAL at 21:25

## 2022-11-21 RX ADMIN — DOCUSATE SODIUM 200 MG: 100 CAPSULE, LIQUID FILLED ORAL at 12:55

## 2022-11-21 RX ADMIN — ENOXAPARIN SODIUM 40 MG: 100 INJECTION SUBCUTANEOUS at 21:24

## 2022-11-21 RX ADMIN — PANTOPRAZOLE SODIUM 40 MG: 40 TABLET, DELAYED RELEASE ORAL at 05:13

## 2022-11-21 RX ADMIN — Medication 10 ML: at 10:01

## 2022-11-21 RX ADMIN — GABAPENTIN 100 MG: 100 CAPSULE ORAL at 10:00

## 2022-11-21 RX ADMIN — ATORVASTATIN CALCIUM 40 MG: 40 TABLET, FILM COATED ORAL at 21:25

## 2022-11-21 ASSESSMENT — PAIN SCALES - GENERAL: PAINLEVEL_OUTOF10: 0

## 2022-11-21 NOTE — CONSULTS
Consulting Physician: Dr. Luca An    Reason for Consult: Urinary retention    History of Present Illness: Gage Hayden is a 76 y.o. male with histoyr of HLD, GERD, BPH who came to the hospital with constipation and urinary retention. He feels the constipation started after being on medications from a respiratory infection then his urinary issues progressed from there. He did take medications to help with his bowels and has now had good BM's. He had a jacob catheter placed for 900cc output and his abdominal discomfort resolved. Creatinine was 2.7 on admission down to 0.7 today. He feels much better at this time. UA with possible infection, culture is pending. Past Medical History:   History reviewed. No pertinent past medical history. Past Surgical History:  History reviewed. No pertinent surgical history. Social History:  Social History     Socioeconomic History    Marital status:      Spouse name: Not on file    Number of children: Not on file    Years of education: Not on file    Highest education level: Not on file   Occupational History    Not on file   Tobacco Use    Smoking status: Never    Smokeless tobacco: Never   Substance and Sexual Activity    Alcohol use: Not Currently    Drug use: Never    Sexual activity: Not on file   Other Topics Concern    Not on file   Social History Narrative    Not on file     Social Determinants of Health     Financial Resource Strain: Not on file   Food Insecurity: Not on file   Transportation Needs: Not on file   Physical Activity: Not on file   Stress: Not on file   Social Connections: Not on file   Intimate Partner Violence: Not on file   Housing Stability: Not on file       Family History:  History reviewed. No pertinent family history.     Meds:   Current Facility-Administered Medications: enoxaparin (LOVENOX) injection 40 mg, 40 mg, SubCUTAneous, Nightly  trospium (SANCTURA) tablet 20 mg, 20 mg, Oral, BID AC  docusate sodium (COLACE) capsule 200 mg, 200 mg, Oral, BID  sodium chloride flush 0.9 % injection 5-40 mL, 5-40 mL, IntraVENous, 2 times per day  sodium chloride flush 0.9 % injection 5-40 mL, 5-40 mL, IntraVENous, PRN  0.9 % sodium chloride infusion, , IntraVENous, PRN  ondansetron (ZOFRAN-ODT) disintegrating tablet 4 mg, 4 mg, Oral, Q8H PRN **OR** ondansetron (ZOFRAN) injection 4 mg, 4 mg, IntraVENous, Q6H PRN  acetaminophen (TYLENOL) tablet 650 mg, 650 mg, Oral, Q6H PRN **OR** acetaminophen (TYLENOL) suppository 650 mg, 650 mg, Rectal, Q6H PRN  polyethylene glycol (GLYCOLAX) packet 17 g, 17 g, Oral, Daily PRN  cefTRIAXone (ROCEPHIN) 1,000 mg in dextrose 5 % 50 mL IVPB mini-bag, 1,000 mg, IntraVENous, Q24H  0.9 % sodium chloride infusion, , IntraVENous, Continuous  aspirin EC tablet 81 mg, 81 mg, Oral, Nightly  atorvastatin (LIPITOR) tablet 40 mg, 40 mg, Oral, Nightly  gabapentin (NEURONTIN) capsule 100 mg, 100 mg, Oral, BID  metoprolol tartrate (LOPRESSOR) tablet 50 mg, 50 mg, Oral, BID  pantoprazole (PROTONIX) tablet 40 mg, 40 mg, Oral, QAM AC  tamsulosin (FLOMAX) capsule 0.4 mg, 0.4 mg, Oral, Nightly  polyethylene glycol (GLYCOLAX) packet 17 g, 17 g, Oral, BID    Review of Systems:  10 Systems were reviewed and negative except as in HPI    Vitals:  /62   Pulse 68   Temp 97.8 °F (36.6 °C) (Oral)   Resp 16   Ht 5' 8\" (1.727 m)   Wt 167 lb 8.8 oz (76 kg)   SpO2 95%   BMI 25.48 kg/m²     Intake/Output Summary (Last 24 hours) at 11/21/2022 1537  Last data filed at 11/21/2022 1248  Gross per 24 hour   Intake 900 ml   Output 3850 ml   Net -2950 ml       Physical Exam:  General Appearance: Alert and oriented, cooperative, no distress, appears stated age  Head: Normocephalic, without obvious abnormality, atraumatic  Back: no CVA tenderness  Lungs: respirations unlabored, no wheezing  Heart: Regular rate and rhythm, no lower extremity edema noted  Abdomen: Soft, non-tender, non-distended, no masses  Skin: Skin color, texture, turgor normal, no rashes or lesions  Neurologic: no gross deficits   Male :  Nonpalpable bladder  No CVA tenderness  Jacob catheter with yellow output  PATRICIA Not indicated    Labs:  CBC   Lab Results   Component Value Date/Time    WBC 9.9 11/21/2022 07:18 AM    RBC 4.10 11/21/2022 07:18 AM    HGB 12.0 11/21/2022 07:18 AM    HCT 36.1 11/21/2022 07:18 AM    MCV 88.0 11/21/2022 07:18 AM    MCH 29.2 11/21/2022 07:18 AM    MCHC 33.2 11/21/2022 07:18 AM    RDW 14.8 11/21/2022 07:18 AM     11/21/2022 07:18 AM    MPV 8.1 11/21/2022 07:18 AM     BMP   Lab Results   Component Value Date/Time     11/21/2022 07:18 AM    K 4.9 11/21/2022 07:18 AM     11/21/2022 07:18 AM    CO2 19 11/21/2022 07:18 AM    BUN 19 11/21/2022 07:18 AM    CREATININE 0.7 11/21/2022 07:18 AM    GLUCOSE 328 11/21/2022 07:18 AM    CALCIUM 9.6 11/21/2022 07:18 AM       Urinalysis:   Lab Results   Component Value Date/Time    COLORU Yellow 11/20/2022 01:04 PM    GLUCOSEU Negative 11/20/2022 01:04 PM    BLOODU MODERATE 11/20/2022 01:04 PM    NITRU Negative 11/20/2022 01:04 PM    LEUKOCYTESUR SMALL 11/20/2022 01:04 PM       Imaging: Pertinent images and radiologist's report were reviewed independently  KUB 11/20/22  Impression   Nonobstructive bowel gas pattern without significant stool volume. Impression/Plan:   - 77y. o. male with constipation, urinary retention, CARMITA, possible UTI. - CARMITA resolved s/p jacob catheter  - Urine culture pending - continue antibiotics  - Continue Flomax  - Voiding trial Wednesday as outpatient if discharged.       CK Lopez - CNP 11/21/20223:37 PM

## 2022-11-21 NOTE — PROGRESS NOTES
Hospitalist Progress Note      PCP: Renetta Banuelos    Date of Admission: 11/20/2022    Chief Complaint: urine retention. Hospital Course:   76year old male patient with a past medical history of hyperlipidemia, BPH, GERD, peripheral neuropathy. Patient presented to emergency with urine retention, patient also reported constipation and abdominal discomfort. Patient was found to be in acute urine retention with over  900 ml upon insertion of Flores's catheter    Subjective:   Patient pleasant this morning, denies any complaints. Medications:  Reviewed    Infusion Medications    sodium chloride      sodium chloride       Scheduled Medications    sodium chloride flush  5-40 mL IntraVENous 2 times per day    enoxaparin  30 mg SubCUTAneous Nightly    cefTRIAXone (ROCEPHIN) IV  1,000 mg IntraVENous Q24H    aspirin  81 mg Oral Nightly    atorvastatin  40 mg Oral Nightly    gabapentin  100 mg Oral BID    metoprolol tartrate  50 mg Oral BID    pantoprazole  40 mg Oral QAM AC    tamsulosin  0.4 mg Oral Nightly    polyethylene glycol  17 g Oral BID    trospium  20 mg Oral Nightly     PRN Meds: sodium chloride flush, sodium chloride, ondansetron **OR** ondansetron, acetaminophen **OR** acetaminophen, polyethylene glycol      Intake/Output Summary (Last 24 hours) at 11/21/2022 0817  Last data filed at 11/20/2022 1545  Gross per 24 hour   Intake --   Output 2100 ml   Net -2100 ml       Physical Exam Performed:    /73   Pulse 70   Temp 98.2 °F (36.8 °C) (Oral)   Resp 17   Ht 5' 8\" (1.727 m)   Wt 167 lb 8.8 oz (76 kg)   SpO2 92%   BMI 25.48 kg/m²     General appearance: No apparent distress, appears stated age and cooperative. HEENT: Pupils equal, round, and reactive to light. Conjunctivae/corneas clear. Neck: Supple, with full range of motion. No jugular venous distention. Trachea midline. Respiratory:  Normal respiratory effort.  Clear to auscultation, bilaterally without Rales/Wheezes/Rhonchi. Cardiovascular: Regular rate and rhythm with normal S1/S2 without murmurs, rubs or gallops. Abdomen: Soft, non-tender, non-distended with normal bowel sounds. Musculoskeletal: No clubbing, cyanosis or edema bilaterally. Full range of motion without deformity. Skin: Skin color, texture, turgor normal.  No rashes or lesions. Neurologic:  Neurovascularly intact without any focal sensory/motor deficits. Cranial nerves: II-XII intact, grossly non-focal.  Psychiatric: Alert and oriented, thought content appropriate, normal insight  Capillary Refill: Brisk, 3 seconds, normal   Peripheral Pulses: +2 palpable, equal bilaterally       Labs:   Recent Labs     11/20/22  1304   WBC 15.5*   HGB 12.1*   HCT 37.0*        Recent Labs     11/20/22  1304   *   K 4.3   CL 94*   CO2 22   BUN 41*   CREATININE 2.7*   CALCIUM 8.7     No results for input(s): AST, ALT, BILIDIR, BILITOT, ALKPHOS in the last 72 hours. No results for input(s): INR in the last 72 hours. No results for input(s): Drew Gubler in the last 72 hours. Urinalysis:      Lab Results   Component Value Date/Time    NITRU Negative 11/20/2022 01:04 PM    WBCUA 15 11/20/2022 01:04 PM    BACTERIA None Seen 11/20/2022 01:04 PM    RBCUA 14 11/20/2022 01:04 PM    BLOODU MODERATE 11/20/2022 01:04 PM    SPECGRAV 1.012 11/20/2022 01:04 PM    GLUCOSEU Negative 11/20/2022 01:04 PM       Radiology:  XR ABDOMEN (KUB) (SINGLE AP VIEW)   Final Result   Nonobstructive bowel gas pattern without significant stool volume. IP CONSULT TO HOSPITALIST  IP CONSULT TO UROLOGY    Assessment/Plan:    Active Hospital Problems    Diagnosis     UTI (urinary tract infection) [N39.0]      Priority: Medium     Acute urine retention  BPH. Patient presented to emergency with acute urine retention, upon presentation patient had suprapubic pain, 900 mL of urine was noted after passing Flores's. Retention is likely due to BPH.     -appreciate urology input.   -Keep on Flores's for today  -Continue tamsulosin    Urinary tract infection  Urinalysis with 15 WBC and positive leukocyte esterase.    -Continue ceftriaxone  -Follow urine culture. CARMITA  Post renal, on presentation creatinine 2.7, normalized    Constipation  Likely a contributing factor to retention, patient is on GlycoLax, will add docusate. Will likely do an enema tomorrow if patient does not pass BM in the next 24 hours. Hyperlipidemia. Continue statin    DVT Prophylaxis: lovenox  Diet: ADULT DIET;  Regular  Code Status: Full Code  PT/OT Eval Status: baseline     Dispo - home in  2-3 days         Army Abigail MD

## 2022-11-22 VITALS
HEIGHT: 68 IN | BODY MASS INDEX: 24.52 KG/M2 | OXYGEN SATURATION: 97 % | SYSTOLIC BLOOD PRESSURE: 158 MMHG | TEMPERATURE: 98.2 F | HEART RATE: 65 BPM | WEIGHT: 161.82 LBS | DIASTOLIC BLOOD PRESSURE: 85 MMHG | RESPIRATION RATE: 16 BRPM

## 2022-11-22 LAB
ANION GAP SERPL CALCULATED.3IONS-SCNC: 9 MMOL/L (ref 3–16)
BASOPHILS ABSOLUTE: 0.1 K/UL (ref 0–0.2)
BASOPHILS RELATIVE PERCENT: 1 %
BUN BLDV-MCNC: 16 MG/DL (ref 7–20)
CALCIUM SERPL-MCNC: 8.1 MG/DL (ref 8.3–10.6)
CHLORIDE BLD-SCNC: 107 MMOL/L (ref 99–110)
CO2: 23 MMOL/L (ref 21–32)
CREAT SERPL-MCNC: 1 MG/DL (ref 0.8–1.3)
EOSINOPHILS ABSOLUTE: 0.2 K/UL (ref 0–0.6)
EOSINOPHILS RELATIVE PERCENT: 2 %
GFR SERPL CREATININE-BSD FRML MDRD: >60 ML/MIN/{1.73_M2}
GLUCOSE BLD-MCNC: 96 MG/DL (ref 70–99)
HCT VFR BLD CALC: 34.8 % (ref 40.5–52.5)
HEMATOLOGY PATH CONSULT: NO
HEMOGLOBIN: 11.5 G/DL (ref 13.5–17.5)
LYMPHOCYTES ABSOLUTE: 1.5 K/UL (ref 1–5.1)
LYMPHOCYTES RELATIVE PERCENT: 16 %
MCH RBC QN AUTO: 29.2 PG (ref 26–34)
MCHC RBC AUTO-ENTMCNC: 33 G/DL (ref 31–36)
MCV RBC AUTO: 88.4 FL (ref 80–100)
METAMYELOCYTES RELATIVE PERCENT: 1 %
MONOCYTES ABSOLUTE: 1.2 K/UL (ref 0–1.3)
MONOCYTES RELATIVE PERCENT: 13 %
NEUTROPHILS ABSOLUTE: 6.5 K/UL (ref 1.7–7.7)
NEUTROPHILS RELATIVE PERCENT: 67 %
OVALOCYTES: ABNORMAL
PDW BLD-RTO: 14.2 % (ref 12.4–15.4)
PLATELET # BLD: 286 K/UL (ref 135–450)
PLATELET SLIDE REVIEW: ADEQUATE
PMV BLD AUTO: 7.8 FL (ref 5–10.5)
POIKILOCYTES: ABNORMAL
POTASSIUM REFLEX MAGNESIUM: 4.7 MMOL/L (ref 3.5–5.1)
RBC # BLD: 3.94 M/UL (ref 4.2–5.9)
SLIDE REVIEW: ABNORMAL
SODIUM BLD-SCNC: 139 MMOL/L (ref 136–145)
URINE CULTURE, ROUTINE: NORMAL
WBC # BLD: 9.6 K/UL (ref 4–11)

## 2022-11-22 PROCEDURE — 80048 BASIC METABOLIC PNL TOTAL CA: CPT

## 2022-11-22 PROCEDURE — 6360000002 HC RX W HCPCS: Performed by: INTERNAL MEDICINE

## 2022-11-22 PROCEDURE — 6370000000 HC RX 637 (ALT 250 FOR IP): Performed by: INTERNAL MEDICINE

## 2022-11-22 PROCEDURE — 36415 COLL VENOUS BLD VENIPUNCTURE: CPT

## 2022-11-22 PROCEDURE — 2580000003 HC RX 258: Performed by: INTERNAL MEDICINE

## 2022-11-22 PROCEDURE — 85025 COMPLETE CBC W/AUTO DIFF WBC: CPT

## 2022-11-22 PROCEDURE — 6370000000 HC RX 637 (ALT 250 FOR IP): Performed by: STUDENT IN AN ORGANIZED HEALTH CARE EDUCATION/TRAINING PROGRAM

## 2022-11-22 RX ORDER — PSEUDOEPHEDRINE HCL 30 MG
200 TABLET ORAL 2 TIMES DAILY
Qty: 60 CAPSULE | Refills: 1 | Status: SHIPPED | OUTPATIENT
Start: 2022-11-22 | End: 2022-12-22

## 2022-11-22 RX ORDER — CEPHALEXIN 500 MG/1
500 CAPSULE ORAL 4 TIMES DAILY
Qty: 4 CAPSULE | Refills: 0 | Status: SHIPPED | OUTPATIENT
Start: 2022-11-23 | End: 2022-11-24

## 2022-11-22 RX ADMIN — TROSPIUM CHLORIDE 20 MG: 20 TABLET, FILM COATED ORAL at 15:38

## 2022-11-22 RX ADMIN — POLYETHYLENE GLYCOL 3350 17 G: 17 POWDER, FOR SOLUTION ORAL at 09:31

## 2022-11-22 RX ADMIN — TROSPIUM CHLORIDE 20 MG: 20 TABLET, FILM COATED ORAL at 05:24

## 2022-11-22 RX ADMIN — METOPROLOL TARTRATE 50 MG: 50 TABLET, FILM COATED ORAL at 09:31

## 2022-11-22 RX ADMIN — DOCUSATE SODIUM 200 MG: 100 CAPSULE, LIQUID FILLED ORAL at 09:31

## 2022-11-22 RX ADMIN — GABAPENTIN 100 MG: 100 CAPSULE ORAL at 09:31

## 2022-11-22 RX ADMIN — SODIUM CHLORIDE: 9 INJECTION, SOLUTION INTRAVENOUS at 05:25

## 2022-11-22 RX ADMIN — PANTOPRAZOLE SODIUM 40 MG: 40 TABLET, DELAYED RELEASE ORAL at 05:24

## 2022-11-22 RX ADMIN — CEFTRIAXONE 1000 MG: 1 INJECTION, POWDER, FOR SOLUTION INTRAMUSCULAR; INTRAVENOUS at 15:38

## 2022-11-22 ASSESSMENT — PAIN SCALES - GENERAL: PAINLEVEL_OUTOF10: 0

## 2022-11-22 NOTE — PROGRESS NOTES
14FR jacob placed via sterile technique per order , no complications noted. 10CC in balloon. Pt tolerated well. 950 CC of urine returned.  Pt and wife educated on caring for jacob at home upon dispo

## 2022-11-22 NOTE — DISCHARGE SUMMARY
Hospital Medicine Discharge Summary    Patient ID: Isaura Garces      Patient's PCP: Padmini Oneill MD    Admit Date: 11/20/2022     Discharge Date:   11/22/22      Admitting Provider: Sherron Meigs, MD     Discharge Provider: Yakov Gutierrez MD     Discharge Diagnoses: Active Hospital Problems    Diagnosis     UTI (urinary tract infection) [N39.0]      Priority: Medium       The patient was seen and examined on day of discharge and this discharge summary is in conjunction with any daily progress note from day of discharge. Hospital Course:     76year old male patient with a past medical history of hyperlipidemia, BPH, GERD, peripheral neuropathy. Patient presented to emergency with urine retention, patient also reported constipation and abdominal discomfort. Patient was found to be in acute urine retention with over  900 ml upon insertion of Flores's catheter    BPH. Patient presented to emergency with acute urine retention, upon presentation patient had suprapubic pain, 900 mL of urine was noted after passing Flores's. Retention is likely due to BPH. Patient will be discharged with [facility] catheter to follow with urology as outpatient     Urinary tract infection  Urinalysis with 15 WBC and positive leukocyte esterase.    -Continue ceftriaxone  -Follow urine culture. CARMITA  Post renal, on presentation creatinine 2.7, normalized     Constipation  Likely a contributing factor to retention, patient is on GlycoLax, will add docusate. Will likely do an enema tomorrow if patient does not pass BM in the next 24 hours. Hyperlipidemia. Continue statin      Physical Exam Performed:     BP (!) 163/77   Pulse (!) 128   Temp 98.2 °F (36.8 °C) (Oral)   Resp 16   Ht 5' 8\" (1.727 m)   Wt 161 lb 13.1 oz (73.4 kg)   SpO2 95%   BMI 24.60 kg/m²       General appearance:  No apparent distress, appears stated age and cooperative.   HEENT:  Normal cephalic, atraumatic without obvious deformity. Pupils equal, round, and reactive to light. Extra ocular muscles intact. Conjunctivae/corneas clear. Neck: Supple, with full range of motion. No jugular venous distention. Trachea midline. Respiratory:  Normal respiratory effort. Clear to auscultation, bilaterally without Rales/Wheezes/Rhonchi. Cardiovascular:  Regular rate and rhythm with normal S1/S2 without murmurs, rubs or gallops. Abdomen: Soft, non-tender, non-distended with normal bowel sounds. Musculoskeletal:  No clubbing, cyanosis or edema bilaterally. Full range of motion without deformity. Skin: Skin color, texture, turgor normal.  No rashes or lesions. Neurologic:  Neurovascularly intact without any focal sensory/motor deficits. Cranial nerves: II-XII intact, grossly non-focal.  Psychiatric:  Alert and oriented, thought content appropriate, normal insight  Capillary Refill: Brisk,< 3 seconds   Peripheral Pulses: +2 palpable, equal bilaterally       Labs: For convenience and continuity at follow-up the following most recent labs are provided:      CBC:    Lab Results   Component Value Date/Time    WBC 9.6 11/22/2022 06:47 AM    HGB 11.5 11/22/2022 06:47 AM    HCT 34.8 11/22/2022 06:47 AM     11/22/2022 06:47 AM       Renal:    Lab Results   Component Value Date/Time     11/22/2022 06:47 AM    K 4.7 11/22/2022 06:47 AM     11/22/2022 06:47 AM    CO2 23 11/22/2022 06:47 AM    BUN 16 11/22/2022 06:47 AM    CREATININE 1.0 11/22/2022 06:47 AM    CALCIUM 8.1 11/22/2022 06:47 AM         Significant Diagnostic Studies    Radiology:   XR ABDOMEN (KUB) (SINGLE AP VIEW)   Final Result   Nonobstructive bowel gas pattern without significant stool volume. Consults:     IP CONSULT TO HOSPITALIST  IP CONSULT TO UROLOGY    Disposition:  home with Flores's catheter    Condition at Discharge: Stable    Discharge Instructions/Follow-up:    - follow up with urology.      Code Status:  Full Code       Activity: activity as tolerated    Diet: regular diet      Discharge Medications:     Current Discharge Medication List             Details   docusate sodium (COLACE, DULCOLAX) 100 MG CAPS Take 200 mg by mouth 2 times daily  Qty: 60 capsule, Refills: 1      cephALEXin (KEFLEX) 500 MG capsule Take 1 capsule by mouth 4 times daily for 1 day  Qty: 4 capsule, Refills: 0                Details   atorvastatin (LIPITOR) 40 MG tablet Take 40 mg by mouth every evening      gabapentin (NEURONTIN) 100 MG capsule Take 100 mg by mouth in the morning and at bedtime. lisinopril (PRINIVIL;ZESTRIL) 10 MG tablet Take 10 mg by mouth every evening      metoprolol tartrate (LOPRESSOR) 50 MG tablet Take 50 mg by mouth 2 times daily      omeprazole (PRILOSEC) 40 MG delayed release capsule Take 40 mg by mouth daily      solifenacin (VESICARE) 5 MG tablet Take 5 mg by mouth every evening      tamsulosin (FLOMAX) 0.4 MG capsule Take 0.4 mg by mouth every evening      tiZANidine (ZANAFLEX) 4 MG tablet Take 4 mg by mouth every 8 hours as needed      Caltrate 600+D Plus Minerals (CALTRATE) 600-800 MG-UNIT TABS tablet Take 1 tablet by mouth daily      aspirin 81 MG EC tablet Take 81 mg by mouth every evening             Time Spent on discharge: 35 in the examination, evaluation, counseling and review of medications and discharge plan. Signed:    Helder Perea MD   11/22/2022      Thank you Esequiel Rojas MD for the opportunity to be involved in this patient's care. If you have any questions or concerns, please feel free to contact me at [phone].

## 2022-11-22 NOTE — PROGRESS NOTES
Pt jacob removed earlier this shift around 0930, pt hasnt voided yet. Bladder scanned: 372 and 380 on the bladder scans.  Will inform MD. Attempted to run water and do other interventions in hopes to urinate on own

## 2022-11-22 NOTE — PROGRESS NOTES
Pt Name: Sera Graves Record Number: [id number]  Date of Birth 1945   Today's Date: 11/22/2022      Subjective:  Awake in bed, no complaints. Had BM yesterday    ROS: Constitutional: No fever    Vitals:  Vitals:    11/22/22 0000 11/22/22 0426 11/22/22 0650 11/22/22 0907   BP: 127/77 (!) 144/75  136/76   Pulse: 65 72  94   Resp: 18 17  16   Temp: 98.1 °F (36.7 °C) 98.2 °F (36.8 °C)  98.5 °F (36.9 °C)   TempSrc: Oral Oral  Oral   SpO2: 96% 96%  94%   Weight:   161 lb 13.1 oz (73.4 kg)    Height:         I/O last 3 completed shifts: In: 2143.7 [P.O.:1140;  I.V.:1003.7]  Out: 4600 [Urine:4600]    Exam:  General: Awake, oriented, no acute distress  Respiratory: Nonlabored breathing  Abdomen: Soft, non-tender, non-distended, no masses  : jacob catheter with clear/yellow output  Skin: Skin color, texture, turgor normal, no rashes or lesions  Neurologic: no gross deficits    CURRENT MEDICATIONS   Scheduled Meds:   enoxaparin  40 mg SubCUTAneous Nightly    trospium  20 mg Oral BID AC    docusate sodium  200 mg Oral BID    sodium chloride flush  5-40 mL IntraVENous 2 times per day    cefTRIAXone (ROCEPHIN) IV  1,000 mg IntraVENous Q24H    aspirin  81 mg Oral Nightly    atorvastatin  40 mg Oral Nightly    gabapentin  100 mg Oral BID    metoprolol tartrate  50 mg Oral BID    pantoprazole  40 mg Oral QAM AC    tamsulosin  0.4 mg Oral Nightly    polyethylene glycol  17 g Oral BID     Continuous Infusions:   sodium chloride      sodium chloride 100 mL/hr at 11/22/22 0525     PRN Meds:.sodium chloride flush, sodium chloride, ondansetron **OR** ondansetron, acetaminophen **OR** acetaminophen, polyethylene glycol    LABS     Recent Labs     11/20/22  1304 11/21/22  0718 11/22/22  0647   WBC 15.5* 9.9 9.6   HGB 12.1* 12.0* 11.5*   HCT 37.0* 36.1* 34.8*    307 286   * 134* 139   K 4.3 4.9 4.7   CL 94* 100 107   CO2 22 19* 23   BUN 41* 19 16   CREATININE 2.7* 0.7* 1.0   CALCIUM 8.7 9.6 8.1* Urine culture [address] day # 2  77y. o. male with constipation, urinary retention, CARMITA  CARMITA resolved s/p jacob catheter  Urine culture NGTD  PLAN   Continue Flomax  Jacob discontinued today for voiding trial. Check PVR x3.    F/u outpatient next week to re-evaluate voiding symptoms    CK Vazquez CNP 11/22/2022 9:31 AM Partially impaired: cannot see medication labels or newsprint, but can see obstacles in path, and the surrounding layout; can count fingers at arm's length/reading glasses

## 2022-11-22 NOTE — PROGRESS NOTES
Pt remains unable to void on own bladder scan 780 ML 6 hrs post jacob removal, page sent to urology group to see if they want me to straight cath or place jacob for retention, awaiting response     UPDATE: 1622: Replace jacob, follow up with Dr. Prosper Echols in 1 weeks Per [facility] MAYCOL MERIDA CNP

## 2022-11-22 NOTE — PLAN OF CARE
Problem: Discharge Planning  Goal: Discharge to home or other facility with appropriate resources  Outcome: Completed     Problem: ABCDS Injury Assessment  Goal: Absence of physical injury  Outcome: Completed     Problem: Safety - Adult  Goal: Free from fall injury  Outcome: Completed     Problem: Pain  Goal: Verbalizes/displays adequate comfort level or baseline comfort level  Outcome: Completed

## 2022-11-22 NOTE — CARE COORDINATION
Case Management Assessment  Initial Evaluation    Date/Time of Evaluation: 11/22/2022 12:43 PM  Assessment Completed by: Ravinder Anglin RN    If patient is discharged prior to next notation, then this note serves as note for discharge by case management. Patient Name: Carrillo Reed                   YOB: 1945  Diagnosis: Hyponatremia [E87.1]  UTI (urinary tract infection) [N39.0]  CARMITA (acute kidney injury) ([facility] [address] [N17.9]  Acute cystitis with hematuria [N30.01]  Urinary retention due to benign prostatic hyperplasia [N40.1, R33.8]  Leukocytosis, unspecified type [D72.829]                   Date / Time: 11/20/2022 12:26 PM    Patient Admission Status: Inpatient   Readmission Risk (Low < 19, Mod (19-27), High > 27): Readmission Risk Score: 8    Current PCP: Kameron Thorne MD  PCP verified by CM? Yes    Chart Reviewed: Yes      History Provided by: Patient  Patient Orientation: Alert and Oriented, Person, Place, Situation, Self    Patient Cognition: Alert    Hospitalization in the last 30 days (Readmission):  No    If yes, Readmission Assessment in CM Navigator will be completed. Advance Directives:      Code Status: Full Code   Patient's Primary Decision Maker is: Legal Next of Kin    [facility] - [phone]    Discharge Planning:    Patient lives with: Spouse/Significant Other Type of Home: House  Primary Care Giver: Self  Patient Support Systems include: Spouse/Significant Other   Current Financial resources: Medicare  Current community resources:    Current services prior to admission: None            Current DME:              Type of Home Care services:  None    ADLS  Prior functional level: Independent in ADLs/IADLs  Current functional level: Independent in ADLs/IADLs    PT AM-PAC:   /24  OT AM-PAC:   /24    Family can provide assistance at DC:  Yes  Would you like Case Management to discuss the discharge plan with any other family

## 2022-11-22 NOTE — DISCHARGE INSTR - COC
Continuity of Care Form    Patient Name: Brina Goss   :  1945  MRN:  [id number]    Admit date:  2022  Discharge date:  ***    Code Status Order: Full Code   Advance Directives:     Admitting Physician:  Braden Lane MD  PCP: Bruce Mendiola MD    Discharging Nurse: [facility] Unit/Room#: K5D-8330/3949-27  Discharging Unit Phone Number: ***    Emergency Contact:   Extended Emergency Contact Information  Primary Emergency Contact: ANDRÉS PEREA  Address: [address] [place] [address], [address] [place] [address] Phone: [phone]  Relation: Spouse    Past Surgical History:  History reviewed. No pertinent surgical history. Immunization History: There is no immunization history on file for this patient. Active Problems:  Patient Active Problem List   Diagnosis Code    UTI (urinary tract infection) N39.0       Isolation/Infection:   Isolation            No Isolation          Patient Infection Status       None to display            Nurse Assessment:  Last Vital Signs: BP (!) 158/85   Pulse 65   Temp 98.2 °F (36.8 °C) (Oral)   Resp 16   Ht 5' 8\" (1.727 m)   Wt 161 lb 13.1 oz (73.4 kg)   SpO2 97%   BMI 24.60 kg/m²     Last documented pain score (0-10 scale): Pain Level: 0  Last Weight:   Wt Readings from Last 1 Encounters:   22 161 lb 13.1 oz (73.4 kg)     Mental Status:  {IP PT MENTAL STATUS:}    IV Access:  { KRAIG IV ACCESS:374329553}    Nursing Mobility/ADLs:  Walking   {CHP DME OBLL:258560392}  Transfer  {CHP DME KCSO:315016365}  Bathing  {CHP DME EKYH:476900979}  Dressing  {CHP DME SOXC:769193594}  Toileting  {CHP DME OBUY:158695206}  Feeding  {CHP DME CDER:073430746}  Med Admin  {CHP DME UQRV:054165892}  Med Delivery   { KRAIG MED Delivery:598745151}    Wound Care Documentation and Therapy:        Elimination:  Continence:    Bowel: {YES / IF:81683}  Bladder: {YES / OQ:54483}  Urinary Catheter: {Urinary Catheter:834646802} Colostomy/Ileostomy/Ileal Conduit: {YES / ID:45413}       Date of Last BM: ***    Intake/Output Summary (Last 24 hours) at 2022 1711  Last data filed at 2022 0526  Gross per 24 hour   Intake 1243.66 ml   Output 1350 ml   Net -106.34 ml     I/O last 3 completed shifts: In: 2143.7 [P.O.:1140;  I.V.:1003.7]  Out: 4600 [Urine:4600]    Safety Concerns:     { KRAIG Safety Concerns:109869233}    Impairments/Disabilities:      { KRAIG Impairments/Disabilities:471873222}    Nutrition Therapy:  Current Nutrition Therapy:   [facility] KRAIG Diet List:680162237}    Routes of Feeding: {[facility] DME Other Feedings:215453548}  Liquids: {Slp liquid thickness:45317}  Daily Fluid Restriction: {CH DME Yes amt example:961127660}  Last Modified Barium Swallow with Video (Video Swallowing Test): {Done Not Done YAAZ:156667135}    Treatments at the Time of Hospital Discharge:   Respiratory Treatments: ***  Oxygen Therapy:  {Therapy; copd oxygen:01157}  Ventilator:    {[facility] Vent MBHL:634441170}    Rehab Therapies: {THERAPEUTIC INTERVENTION:4061630530}  Weight Bearing Status/Restrictions: [facility] CC Weight Bearin}  Other Medical Equipment (for information only, NOT a DME order):  {EQUIPMENT:539573628}  Other Treatments: ***    Patient's personal belongings (please select all that are sent with patient):  {[facility] DME Belongings:920213154}    RN SIGNATURE:  {Esignature:783113584}    CASE MANAGEMENT/SOCIAL WORK SECTION    Inpatient Status Date: ***    Readmission Risk Assessment Score:  Readmission Risk              Risk of Unplanned Readmission:  15           Discharging to Facility/ Agency   Name:   Address:  Phone:  Fax:    Dialysis Facility (if applicable)   Name:  Address:  Dialysis Schedule:  Phone:  Fax:    / signature: {Esignature:593489725}    PHYSICIAN SECTION    Prognosis: {Prognosis:5807846670}    Condition at Discharge: [facility] Patient Condition:247921628}    Rehab Potential (if transferring to Rehab): {Prognosis:7858098470}    Recommended Labs or Other Treatments After Discharge: ***    Physician Certification: I certify the above information and transfer of Viola Grace  is necessary for the continuing treatment of the diagnosis listed and that he requires {Admit to Appropriate Level of Care:57532} for {GREATER/LESS:442988811} 30 days.      Update Admission H&P: {CHP DME Changes in NOVJO:058676162}    PHYSICIAN SIGNATURE:  {Esignature:885338192}

## 2022-11-22 NOTE — ACP (ADVANCE CARE PLANNING)
Advance Care Planning     Advance Care Planning Activator (Inpatient)  Conversation Note      Date of ACP Conversation: 11/22/2022     Conversation Conducted with: Patient with Decision Making Capacity    ACP Activator: Maribel Clemons 43 Decision Maker:     Current Designated Health Care Decision Maker:     Primary Decision Maker: Georginawest kinds - [phone]    Today we documented Decision Maker(s) consistent with Legal Next of Kin hierarchy. Care Preferences    Ventilation: \"If you were in your present state of health and suddenly became very ill and were unable to breathe on your own, what would your preference be about the use of a ventilator (breathing machine) if it were available to you? \"      Would the patient desire the use of ventilator (breathing machine)?: no    \"If your health worsens and it becomes clear that your chance of recovery is unlikely, what would your preference be about the use of a ventilator (breathing machine) if it were available to you? \"     Would the patient desire the use of ventilator (breathing machine)?: No      Resuscitation  \"CPR works best to restart the heart when there is a sudden event, like a heart attack, in someone who is otherwise healthy. Unfortunately, CPR does not typically restart the heart for people who have serious health conditions or who are very sick. \"    \"In the event your heart stopped as a result of an underlying serious health condition, would you want attempts to be made to restart your heart (answer \"yes\" for attempt to resuscitate) or would you prefer a natural death (answer \"no\" for do not attempt to resuscitate)? \" no       [] Yes   [] No   Educated Patient / Darryl Santana regarding differences between Advance Directives and portable DNR orders.     Length of ACP Conversation in minutes:  5 min    Conversation Outcomes:  [x] ACP discussion completed  [] Existing advance directive reviewed with patient; no changes to patient's previously recorded wishes  [] New Advance Directive completed  [] Portable Do Not Rescitate prepared for Provider review and signature  [] POLST/POST/MOLST/MOST prepared for Provider review and signature      Follow-up plan:    [] Schedule follow-up conversation to continue planning  [x] Referred individual to Provider for additional questions/concerns   [] Advised patient/agent/surrogate to review completed ACP document and update if needed with changes in condition, patient preferences or care setting    [x] This note routed to one or more involved healthcare providers        Shannan Leone RN, BSN,   [phone]  Electronically signed by Shannan Leone RN on 11/22/2022 at 12:38 PM

## 2022-11-22 NOTE — PROGRESS NOTES
Pt being DCd this date. PIV removed with no complications noted. Pt leaving with jacob that is working as desired, education for care provided to pt and wife, education provided via demonstration,verbal and reading material, extra supplies provided for care such as leg bag. Pt aware of medication changed and to pick them up from outside pharmacy, Pt and wife aware to follow up with urology. Pt and wife deny any additional questions.  Pt has all personal belongings

## 2022-11-24 LAB
BLOOD CULTURE, ROUTINE: NORMAL
CULTURE, BLOOD 2: NORMAL

## 2022-12-12 NOTE — PROGRESS NOTES
[address] time____1:25PM________        Surgery time___2:55PM_________    Take the following medications with a sip of water: Follow your MD/Surgeons pre-procedure instructions regarding your medications     Do not eat or drink anything after 12:00 midnight prior to your surgery. This includes water chewing gum, mints and ice chips. You may brush your teeth and gargle the morning of your surgery, but do not swallow the water     Please see your family doctor/pediatrician for a history and physical and/or concerning medications. Bring any test results/reports from your physicians office. If you are under the care of a heart doctor or specialist doctor, please be aware that you may be asked to them for clearance    You may be asked to stop blood thinners such as Coumadin, Plavix, Fragmin, Lovenox, etc., or any anti-inflammatories such as:  Aspirin, Ibuprofen, Advil, Naproxen prior to your surgery. MAY TAKE TYLENOL   We also ask that you stop any OTC medications such as fish oil, vitamin E, glucosamine, garlic, Multivitamins, COQ 10, etc.    We ask that you do not smoke 24 hours prior to surgery  We ask that you do not  drink any alcoholic beverages 24 hours prior to surgery     You must make arrangements for a responsible adult to take you home after your surgery. For your safety you will not be allowed to leave alone or drive yourself home. Your surgery will be cancelled if you do not have a ride home. Also for your safety, it is strongly suggested that someone stay with you the first 24 hours after your surgery. A parent or legal guardian must accompany a child scheduled for surgery and plan to stay at the hospital until the child is discharged. Please do not bring other children with you. For your comfort, please wear simple loose fitting clothing to the hospital.  Please do not bring valuables.     Do not wear any make-up or nail polish on your fingers or toes      For your safety, please do not wear any jewelry or body piercing's on the day of surgery. All jewelry must be removed. If you have dentures, they will be removed before going to operating room. For your convenience, we will provide you with a container. If you wear contact lenses or glasses, they will be removed, please bring a case for them. If you have a living will and a durable power of  for healthcare, please bring in a copy. As part of our patient safety program to minimize surgical site infections, we ask you to do the following:    Please notify your surgeon if you develop any illness between         now and the  day of your surgery. This includes a cough, cold, fever, sore throat, nausea,         or vomiting, and diarrhea, etc.   Please notify your surgeon if you experience dizziness, shortness         of breath or blurred vision between now and the time of your surgery. Do not shave your operative site 96 hours prior to surgery. For face and neck surgery, men may use an electric razor 48 hours   prior to surgery. You may shower the night before surgery or the morning of   your surgery with an antibacterial soap. You will need to bring a photo ID and insurance card    [facility] has an onsite pharmacy, would you like to utilize our pharmacy     If you will be staying overnight and use a C-pap machine, please bring   your C-pap to hospital     Our goal is to provide you with excellent care, therefore, visitors will be limited to two(2) in the room at a time so that we may focus on providing this care for you. Please contact pre-admission testing if you have any further questions.                  [facility] phone number:  [phone] PAT fax number:  [phone]  Please note these are generalized instructions for all surgical cases, you may be provided with more specific instructions according to your surgery. C-Difficile admission screening and protocol:       * Admitted with diarrhea? [] YES    [x]  NO     *Prior history of C-Diff. In last 3 months? [] YES    [x]  NO     *Antibiotic use in the past 6-8 weeks? []  NO    [x]  YES                 If yes, which ANTIBIOTIC AND REASON___UPPER RESPIRATORY___     *Prior hospitalization or nursing home in the last month? [x]  YES    []  NO UPPER RESPIRATORY INFECTION        SAFETY FIRST. .call before you fall

## 2022-12-13 ENCOUNTER — HOSPITAL ENCOUNTER (OUTPATIENT)
Dept: PREADMISSION TESTING | Age: 77
Discharge: HOME | End: 2022-12-17
Payer: MEDICARE

## 2022-12-13 DIAGNOSIS — Z01.818 ENCOUNTER FOR PREADMISSION TESTING: ICD-10-CM

## 2022-12-13 LAB
ABO/RH: NORMAL
ANTIBODY SCREEN: NORMAL
APTT: 26.1 SEC (ref 23–34.3)
EKG ATRIAL RATE: 67 BPM
EKG DIAGNOSIS: NORMAL
EKG P AXIS: 42 DEGREES
EKG P-R INTERVAL: 164 MS
EKG Q-T INTERVAL: 402 MS
EKG QRS DURATION: 108 MS
EKG QTC CALCULATION (BAZETT): 424 MS
EKG R AXIS: -18 DEGREES
EKG T AXIS: -9 DEGREES
EKG VENTRICULAR RATE: 67 BPM
HCT VFR BLD CALC: 39.4 % (ref 40.5–52.5)
HEMOGLOBIN: 12.7 G/DL (ref 13.5–17.5)
INR BLD: 1.05 (ref 0.87–1.14)
MCH RBC QN AUTO: 29.1 PG (ref 26–34)
MCHC RBC AUTO-ENTMCNC: 32.3 G/DL (ref 31–36)
MCV RBC AUTO: 90.2 FL (ref 80–100)
PDW BLD-RTO: 15 % (ref 12.4–15.4)
PLATELET # BLD: 277 K/UL (ref 135–450)
PMV BLD AUTO: 8.4 FL (ref 5–10.5)
PROTHROMBIN TIME: 13.6 SEC (ref 11.7–14.5)
RBC # BLD: 4.37 M/UL (ref 4.2–5.9)
WBC # BLD: 9 K/UL (ref 4–11)

## 2022-12-13 PROCEDURE — 86900 BLOOD TYPING SEROLOGIC ABO: CPT

## 2022-12-13 PROCEDURE — 93010 ELECTROCARDIOGRAM REPORT: CPT | Performed by: INTERNAL MEDICINE

## 2022-12-13 PROCEDURE — 86901 BLOOD TYPING SEROLOGIC RH(D): CPT

## 2022-12-13 PROCEDURE — 85730 THROMBOPLASTIN TIME PARTIAL: CPT

## 2022-12-13 PROCEDURE — 85027 COMPLETE CBC AUTOMATED: CPT

## 2022-12-13 PROCEDURE — 86850 RBC ANTIBODY SCREEN: CPT

## 2022-12-13 PROCEDURE — 93005 ELECTROCARDIOGRAM TRACING: CPT | Performed by: UROLOGY

## 2022-12-13 PROCEDURE — 85610 PROTHROMBIN TIME: CPT

## 2022-12-20 ENCOUNTER — ANESTHESIA EVENT (OUTPATIENT)
Dept: OPERATING ROOM | Age: 77
End: 2022-12-20
Payer: MEDICARE

## 2022-12-20 PROBLEM — N39.0 UTI (URINARY TRACT INFECTION): Status: RESOLVED | Noted: 2022-11-20 | Resolved: 2022-12-20

## 2022-12-21 ENCOUNTER — ANESTHESIA (OUTPATIENT)
Dept: OPERATING ROOM | Age: 77
End: 2022-12-21
Payer: MEDICARE

## 2022-12-21 ENCOUNTER — HOSPITAL ENCOUNTER (OUTPATIENT)
Age: 77
Discharge: HOME | End: 2022-12-22
Attending: UROLOGY | Admitting: UROLOGY
Payer: MEDICARE

## 2022-12-21 DIAGNOSIS — N40.1 BENIGN PROSTATIC HYPERPLASIA WITH LOWER URINARY TRACT SYMPTOMS, SYMPTOM DETAILS UNSPECIFIED: ICD-10-CM

## 2022-12-21 DIAGNOSIS — Z01.818 ENCOUNTER FOR PREADMISSION TESTING: Primary | ICD-10-CM

## 2022-12-21 PROBLEM — N13.8 BPH WITH URINARY OBSTRUCTION: Status: ACTIVE | Noted: 2022-12-21

## 2022-12-21 LAB
ABO/RH: NORMAL
ANTIBODY SCREEN: NORMAL

## 2022-12-21 PROCEDURE — A4217 STERILE WATER/SALINE, 500 ML: HCPCS | Performed by: UROLOGY

## 2022-12-21 PROCEDURE — 2580000003 HC RX 258: Performed by: UROLOGY

## 2022-12-21 PROCEDURE — 6370000000 HC RX 637 (ALT 250 FOR IP): Performed by: UROLOGY

## 2022-12-21 PROCEDURE — 6360000002 HC RX W HCPCS: Performed by: ANESTHESIOLOGY

## 2022-12-21 PROCEDURE — 86901 BLOOD TYPING SEROLOGIC RH(D): CPT

## 2022-12-21 PROCEDURE — 2500000003 HC RX 250 WO HCPCS

## 2022-12-21 PROCEDURE — 6360000002 HC RX W HCPCS: Performed by: UROLOGY

## 2022-12-21 PROCEDURE — 7100000000 HC PACU RECOVERY - FIRST 15 MIN: Performed by: UROLOGY

## 2022-12-21 PROCEDURE — 3700000000 HC ANESTHESIA ATTENDED CARE: Performed by: UROLOGY

## 2022-12-21 PROCEDURE — 6360000002 HC RX W HCPCS: Performed by: NURSE ANESTHETIST, CERTIFIED REGISTERED

## 2022-12-21 PROCEDURE — 3600000014 HC SURGERY LEVEL 4 ADDTL 15MIN: Performed by: UROLOGY

## 2022-12-21 PROCEDURE — 6360000002 HC RX W HCPCS

## 2022-12-21 PROCEDURE — 2709999900 HC NON-CHARGEABLE SUPPLY: Performed by: UROLOGY

## 2022-12-21 PROCEDURE — 7100000001 HC PACU RECOVERY - ADDTL 15 MIN: Performed by: UROLOGY

## 2022-12-21 PROCEDURE — 2720000010 HC SURG SUPPLY STERILE: Performed by: UROLOGY

## 2022-12-21 PROCEDURE — 2580000003 HC RX 258: Performed by: ANESTHESIOLOGY

## 2022-12-21 PROCEDURE — 86900 BLOOD TYPING SEROLOGIC ABO: CPT

## 2022-12-21 PROCEDURE — 86850 RBC ANTIBODY SCREEN: CPT

## 2022-12-21 PROCEDURE — 3700000001 HC ADD 15 MINUTES (ANESTHESIA): Performed by: UROLOGY

## 2022-12-21 PROCEDURE — 88305 TISSUE EXAM BY PATHOLOGIST: CPT

## 2022-12-21 PROCEDURE — 3600000004 HC SURGERY LEVEL 4 BASE: Performed by: UROLOGY

## 2022-12-21 PROCEDURE — 2500000003 HC RX 250 WO HCPCS: Performed by: NURSE ANESTHETIST, CERTIFIED REGISTERED

## 2022-12-21 RX ORDER — SODIUM CHLORIDE 9 MG/ML
INJECTION, SOLUTION INTRAVENOUS PRN
Status: DISCONTINUED | OUTPATIENT
Start: 2022-12-21 | End: 2022-12-22 | Stop reason: HOSPADM

## 2022-12-21 RX ORDER — SODIUM CHLORIDE 0.9 % (FLUSH) 0.9 %
5-40 SYRINGE (ML) INJECTION PRN
Status: DISCONTINUED | OUTPATIENT
Start: 2022-12-21 | End: 2022-12-21 | Stop reason: HOSPADM

## 2022-12-21 RX ORDER — ATROPA BELLADONNA AND OPIUM 16.2; 3 MG/1; MG/1
30 SUPPOSITORY RECTAL EVERY 8 HOURS PRN
Status: DISCONTINUED | OUTPATIENT
Start: 2022-12-21 | End: 2022-12-22 | Stop reason: HOSPADM

## 2022-12-21 RX ORDER — GABAPENTIN 100 MG/1
100 CAPSULE ORAL 2 TIMES DAILY
Status: DISCONTINUED | OUTPATIENT
Start: 2022-12-21 | End: 2022-12-22 | Stop reason: HOSPADM

## 2022-12-21 RX ORDER — SUCCINYLCHOLINE/SOD CL,ISO/PF 200MG/10ML
SYRINGE (ML) INTRAVENOUS PRN
Status: DISCONTINUED | OUTPATIENT
Start: 2022-12-21 | End: 2022-12-21 | Stop reason: SDUPTHER

## 2022-12-21 RX ORDER — SODIUM CHLORIDE 9 MG/ML
INJECTION, SOLUTION INTRAVENOUS CONTINUOUS
Status: DISCONTINUED | OUTPATIENT
Start: 2022-12-21 | End: 2022-12-21 | Stop reason: HOSPADM

## 2022-12-21 RX ORDER — SODIUM CHLORIDE 0.9 % (FLUSH) 0.9 %
5-40 SYRINGE (ML) INJECTION PRN
Status: DISCONTINUED | OUTPATIENT
Start: 2022-12-21 | End: 2022-12-22 | Stop reason: HOSPADM

## 2022-12-21 RX ORDER — SODIUM CHLORIDE 0.9 % (FLUSH) 0.9 %
5-40 SYRINGE (ML) INJECTION EVERY 12 HOURS SCHEDULED
Status: DISCONTINUED | OUTPATIENT
Start: 2022-12-21 | End: 2022-12-22 | Stop reason: HOSPADM

## 2022-12-21 RX ORDER — PANTOPRAZOLE SODIUM 40 MG/1
40 TABLET, DELAYED RELEASE ORAL
Status: DISCONTINUED | OUTPATIENT
Start: 2022-12-21 | End: 2022-12-22 | Stop reason: HOSPADM

## 2022-12-21 RX ORDER — DROPERIDOL 2.5 MG/ML
0.62 INJECTION, SOLUTION INTRAMUSCULAR; INTRAVENOUS
Status: DISCONTINUED | OUTPATIENT
Start: 2022-12-21 | End: 2022-12-21 | Stop reason: HOSPADM

## 2022-12-21 RX ORDER — DEXAMETHASONE SODIUM PHOSPHATE 4 MG/ML
INJECTION, SOLUTION INTRA-ARTICULAR; INTRALESIONAL; INTRAMUSCULAR; INTRAVENOUS; SOFT TISSUE PRN
Status: DISCONTINUED | OUTPATIENT
Start: 2022-12-21 | End: 2022-12-21 | Stop reason: SDUPTHER

## 2022-12-21 RX ORDER — ONDANSETRON 2 MG/ML
INJECTION INTRAMUSCULAR; INTRAVENOUS PRN
Status: DISCONTINUED | OUTPATIENT
Start: 2022-12-21 | End: 2022-12-21 | Stop reason: SDUPTHER

## 2022-12-21 RX ORDER — METOPROLOL TARTRATE 50 MG/1
50 TABLET, FILM COATED ORAL 2 TIMES DAILY
Status: DISCONTINUED | OUTPATIENT
Start: 2022-12-21 | End: 2022-12-22 | Stop reason: HOSPADM

## 2022-12-21 RX ORDER — SODIUM CHLORIDE 9 MG/ML
INJECTION, SOLUTION INTRAVENOUS PRN
Status: DISCONTINUED | OUTPATIENT
Start: 2022-12-21 | End: 2022-12-21 | Stop reason: HOSPADM

## 2022-12-21 RX ORDER — FENTANYL CITRATE 50 UG/ML
25 INJECTION, SOLUTION INTRAMUSCULAR; INTRAVENOUS EVERY 5 MIN PRN
Status: DISCONTINUED | OUTPATIENT
Start: 2022-12-21 | End: 2022-12-21 | Stop reason: HOSPADM

## 2022-12-21 RX ORDER — SODIUM CHLORIDE 0.9 % (FLUSH) 0.9 %
5-40 SYRINGE (ML) INJECTION EVERY 12 HOURS SCHEDULED
Status: DISCONTINUED | OUTPATIENT
Start: 2022-12-21 | End: 2022-12-21 | Stop reason: HOSPADM

## 2022-12-21 RX ORDER — SENNA AND DOCUSATE SODIUM 50; 8.6 MG/1; MG/1
1 TABLET, FILM COATED ORAL 2 TIMES DAILY
Status: DISCONTINUED | OUTPATIENT
Start: 2022-12-21 | End: 2022-12-22 | Stop reason: HOSPADM

## 2022-12-21 RX ORDER — EPHEDRINE SULFATE/0.9% NACL/PF 50 MG/5 ML
SYRINGE (ML) INTRAVENOUS PRN
Status: DISCONTINUED | OUTPATIENT
Start: 2022-12-21 | End: 2022-12-21 | Stop reason: SDUPTHER

## 2022-12-21 RX ORDER — ONDANSETRON 4 MG/1
4 TABLET, ORALLY DISINTEGRATING ORAL EVERY 8 HOURS PRN
Status: DISCONTINUED | OUTPATIENT
Start: 2022-12-21 | End: 2022-12-22 | Stop reason: HOSPADM

## 2022-12-21 RX ORDER — PROPOFOL 10 MG/ML
INJECTION, EMULSION INTRAVENOUS PRN
Status: DISCONTINUED | OUTPATIENT
Start: 2022-12-21 | End: 2022-12-21 | Stop reason: SDUPTHER

## 2022-12-21 RX ORDER — ROCURONIUM BROMIDE 10 MG/ML
INJECTION, SOLUTION INTRAVENOUS PRN
Status: DISCONTINUED | OUTPATIENT
Start: 2022-12-21 | End: 2022-12-21 | Stop reason: SDUPTHER

## 2022-12-21 RX ORDER — FENTANYL CITRATE 50 UG/ML
INJECTION, SOLUTION INTRAMUSCULAR; INTRAVENOUS PRN
Status: DISCONTINUED | OUTPATIENT
Start: 2022-12-21 | End: 2022-12-21 | Stop reason: SDUPTHER

## 2022-12-21 RX ORDER — ONDANSETRON 2 MG/ML
4 INJECTION INTRAMUSCULAR; INTRAVENOUS
Status: DISCONTINUED | OUTPATIENT
Start: 2022-12-21 | End: 2022-12-21 | Stop reason: HOSPADM

## 2022-12-21 RX ORDER — PHENYLEPHRINE HCL IN 0.9% NACL 1 MG/10 ML
SYRINGE (ML) INTRAVENOUS PRN
Status: DISCONTINUED | OUTPATIENT
Start: 2022-12-21 | End: 2022-12-21 | Stop reason: SDUPTHER

## 2022-12-21 RX ORDER — MAGNESIUM HYDROXIDE 1200 MG/15ML
LIQUID ORAL CONTINUOUS PRN
Status: COMPLETED | OUTPATIENT
Start: 2022-12-21 | End: 2022-12-21

## 2022-12-21 RX ORDER — LIDOCAINE HYDROCHLORIDE 20 MG/ML
INJECTION, SOLUTION EPIDURAL; INFILTRATION; INTRACAUDAL; PERINEURAL PRN
Status: DISCONTINUED | OUTPATIENT
Start: 2022-12-21 | End: 2022-12-21 | Stop reason: SDUPTHER

## 2022-12-21 RX ORDER — LISINOPRIL 10 MG/1
10 TABLET ORAL EVERY EVENING
Status: DISCONTINUED | OUTPATIENT
Start: 2022-12-21 | End: 2022-12-22 | Stop reason: HOSPADM

## 2022-12-21 RX ORDER — POLYETHYLENE GLYCOL 3350 17 G/17G
17 POWDER, FOR SOLUTION ORAL DAILY
Status: DISCONTINUED | OUTPATIENT
Start: 2022-12-21 | End: 2022-12-22 | Stop reason: HOSPADM

## 2022-12-21 RX ORDER — OXYCODONE HYDROCHLORIDE 10 MG/1
10 TABLET ORAL EVERY 4 HOURS PRN
Status: DISCONTINUED | OUTPATIENT
Start: 2022-12-21 | End: 2022-12-22 | Stop reason: HOSPADM

## 2022-12-21 RX ORDER — ATROPA BELLADONNA AND OPIUM 16.2; 6 MG/1; MG/1
SUPPOSITORY RECTAL
Status: COMPLETED | OUTPATIENT
Start: 2022-12-21 | End: 2022-12-21

## 2022-12-21 RX ORDER — ACETAMINOPHEN 325 MG/1
650 TABLET ORAL EVERY 4 HOURS PRN
Status: DISCONTINUED | OUTPATIENT
Start: 2022-12-21 | End: 2022-12-22 | Stop reason: HOSPADM

## 2022-12-21 RX ORDER — ONDANSETRON 2 MG/ML
4 INJECTION INTRAMUSCULAR; INTRAVENOUS EVERY 6 HOURS PRN
Status: DISCONTINUED | OUTPATIENT
Start: 2022-12-21 | End: 2022-12-22 | Stop reason: HOSPADM

## 2022-12-21 RX ORDER — SODIUM CHLORIDE 9 MG/ML
INJECTION, SOLUTION INTRAVENOUS CONTINUOUS
Status: DISCONTINUED | OUTPATIENT
Start: 2022-12-21 | End: 2022-12-22 | Stop reason: HOSPADM

## 2022-12-21 RX ORDER — OXYCODONE HYDROCHLORIDE 5 MG/1
5 TABLET ORAL EVERY 4 HOURS PRN
Status: DISCONTINUED | OUTPATIENT
Start: 2022-12-21 | End: 2022-12-22 | Stop reason: HOSPADM

## 2022-12-21 RX ORDER — ATORVASTATIN CALCIUM 40 MG/1
40 TABLET, FILM COATED ORAL EVERY EVENING
Status: DISCONTINUED | OUTPATIENT
Start: 2022-12-21 | End: 2022-12-22 | Stop reason: HOSPADM

## 2022-12-21 RX ADMIN — CEFTRIAXONE 1000 MG: 1 INJECTION, POWDER, FOR SOLUTION INTRAMUSCULAR; INTRAVENOUS at 12:56

## 2022-12-21 RX ADMIN — LISINOPRIL 10 MG: 10 TABLET ORAL at 20:35

## 2022-12-21 RX ADMIN — DEXAMETHASONE SODIUM PHOSPHATE 8 MG: 4 INJECTION, SOLUTION INTRAMUSCULAR; INTRAVENOUS at 12:55

## 2022-12-21 RX ADMIN — SODIUM CHLORIDE: 9 INJECTION, SOLUTION INTRAVENOUS at 16:44

## 2022-12-21 RX ADMIN — Medication 100 MCG: at 13:03

## 2022-12-21 RX ADMIN — SODIUM CHLORIDE: 9 INJECTION, SOLUTION INTRAVENOUS at 12:14

## 2022-12-21 RX ADMIN — Medication 200 MCG: at 13:06

## 2022-12-21 RX ADMIN — FENTANYL CITRATE 50 MCG: 50 INJECTION INTRAMUSCULAR; INTRAVENOUS at 12:48

## 2022-12-21 RX ADMIN — GABAPENTIN 100 MG: 100 CAPSULE ORAL at 20:34

## 2022-12-21 RX ADMIN — Medication 10 MG: at 13:34

## 2022-12-21 RX ADMIN — METOPROLOL TARTRATE 50 MG: 50 TABLET ORAL at 20:37

## 2022-12-21 RX ADMIN — Medication 140 MG: at 12:50

## 2022-12-21 RX ADMIN — PROPOFOL 40 MG: 10 INJECTION, EMULSION INTRAVENOUS at 12:51

## 2022-12-21 RX ADMIN — FENTANYL CITRATE 25 MCG: 50 INJECTION INTRAMUSCULAR; INTRAVENOUS at 15:24

## 2022-12-21 RX ADMIN — ROCURONIUM BROMIDE 10 MG: 10 INJECTION INTRAVENOUS at 13:54

## 2022-12-21 RX ADMIN — Medication 10 MG: at 13:07

## 2022-12-21 RX ADMIN — Medication 100 MCG: at 13:01

## 2022-12-21 RX ADMIN — DOCUSATE SODIUM 50 MG AND SENNOSIDES 8.6 MG 1 TABLET: 8.6; 5 TABLET, FILM COATED ORAL at 20:34

## 2022-12-21 RX ADMIN — OXYCODONE 5 MG: 5 TABLET ORAL at 17:49

## 2022-12-21 RX ADMIN — Medication 10 MG: at 13:36

## 2022-12-21 RX ADMIN — LIDOCAINE HYDROCHLORIDE 80 MG: 20 INJECTION, SOLUTION EPIDURAL; INFILTRATION; INTRACAUDAL; PERINEURAL at 12:50

## 2022-12-21 RX ADMIN — FENTANYL CITRATE 25 MCG: 50 INJECTION INTRAMUSCULAR; INTRAVENOUS at 14:34

## 2022-12-21 RX ADMIN — ATORVASTATIN CALCIUM 40 MG: 40 TABLET, FILM COATED ORAL at 20:35

## 2022-12-21 RX ADMIN — ROCURONIUM BROMIDE 40 MG: 10 INJECTION INTRAVENOUS at 12:56

## 2022-12-21 RX ADMIN — SUGAMMADEX 200 MG: 100 INJECTION, SOLUTION INTRAVENOUS at 14:04

## 2022-12-21 RX ADMIN — PANTOPRAZOLE SODIUM 40 MG: 40 TABLET, DELAYED RELEASE ORAL at 17:45

## 2022-12-21 RX ADMIN — Medication 10 MG: at 13:39

## 2022-12-21 RX ADMIN — FENTANYL CITRATE 25 MCG: 50 INJECTION INTRAMUSCULAR; INTRAVENOUS at 15:11

## 2022-12-21 RX ADMIN — ONDANSETRON 4 MG: 2 INJECTION INTRAMUSCULAR; INTRAVENOUS at 14:02

## 2022-12-21 RX ADMIN — PROPOFOL 100 MG: 10 INJECTION, EMULSION INTRAVENOUS at 12:50

## 2022-12-21 RX ADMIN — Medication 200 MCG: at 13:41

## 2022-12-21 ASSESSMENT — PAIN DESCRIPTION - LOCATION
LOCATION: GROIN
LOCATION: PERINEUM

## 2022-12-21 ASSESSMENT — PAIN SCALES - GENERAL
PAINLEVEL_OUTOF10: 4
PAINLEVEL_OUTOF10: 2
PAINLEVEL_OUTOF10: 5
PAINLEVEL_OUTOF10: 6
PAINLEVEL_OUTOF10: 4

## 2022-12-21 ASSESSMENT — PAIN DESCRIPTION - DESCRIPTORS: DESCRIPTORS: ACHING

## 2022-12-21 ASSESSMENT — PAIN - FUNCTIONAL ASSESSMENT: PAIN_FUNCTIONAL_ASSESSMENT: 0-10

## 2022-12-21 ASSESSMENT — LIFESTYLE VARIABLES: SMOKING_STATUS: 0

## 2022-12-21 NOTE — BRIEF OP NOTE
Brief Postoperative Note      Patient: Gage Hayden  YOB: 1945  MRN: [id number]    Date of Procedure: 12/21/2022    Pre-Op Diagnosis: ENLARGED PROSTATE WITH URINARY SYMPTOMS    Post-Op Diagnosis: Same       Procedure(s):  CYSTOSCOPY TRANSURETHRAL RESECTION OF PROSTATE    Surgeon(s):  Dione Moy MD    Assistant:  * No surgical staff found *    Anesthesia: General    Estimated Blood Loss (mL): Minimal    Complications: None    Specimens:   ID Type Source Tests Collected by Time Destination   A : A) Prostate chips Tissue Prostate SURGICAL PATHOLOGY Dione Moy MD 12/21/2022 1322        Implants:  * No implants in log *      Drains:   Urinary Catheter 12/21/22 (Active)   Site Assessment No urethral drainage 12/21/22 1411   Urine Color Colorless 12/21/22 1411   Urine Appearance Clear 12/21/22 1411       [REMOVED] Urinary Catheter 11/20/22 (Removed)   $ Urethral catheter insertion $ Not inserted for procedure 11/20/22 1532   Catheter Indications Urinary retention (acute or chronic), continuous bladder irrigation or bladder outlet obstruction 11/22/22 0845   Urine Color Yellow 11/22/22 0845   Urine Appearance Clear 11/22/22 0845   Collection Container Standard 11/22/22 0845   Status Draining 11/22/22 0845   Output (mL) 950 mL 11/22/22 0526       [REMOVED] Urinary Catheter 11/22/22 Flores (Removed)       Findings: trilobar hyperplasia    Electronically signed by Sheila Lei MD on 12/21/2022 at 2:24 PM

## 2022-12-21 NOTE — H&P
Preoperative H&P Update    Emanuel Durant was seen, history and physical examination reviewed, and patient examined by me today. There have been no significant clinical changes since the completion of the previous history and physical.    The risk, benefits, and alternatives of the proposed procedure have been explained to the patient (or appropriate guardian) and understanding verbalized. All questions answered. Patient wishes to proceed.     Electronically signed by: Marisol Quiroz MD,12/21/2022,12:29 PM

## 2022-12-21 NOTE — PROGRESS NOTES
Pt to  from PACU at this time, VS WNL for pt. Pt denies any pain, Flores working as desired with CBI going slowly. Pt urine clear with no clots noted. Pt A&O 4 upon arrival. Wife is at bedside, pt oriented to room as well as call system. Pt provided with menu to order food. Pt denies any pain currently, pt resting in bed with call light within reach. Denies any needs currently.

## 2022-12-21 NOTE — ANESTHESIA PRE PROCEDURE
mini-bag  1,000 mg IntraVENous Once Katja Shirley MD        0.9 % sodium chloride infusion   IntraVENous Continuous Sergio Gómez  mL/hr at 22 1229 NoRateChange at 22 1229    sodium chloride flush 0.9 % injection 5-40 mL  5-40 mL IntraVENous 2 times per day Sergio Gómez MD        sodium chloride flush 0.9 % injection 5-40 mL  5-40 mL IntraVENous PRN Sergio Gómez MD        0.9 % sodium chloride infusion   IntraVENous PRN Sergio Gómez MD           Allergies:  No Known Allergies    Problem List:    Patient Active Problem List   Diagnosis Code   (none) - all problems resolved or deleted       Past Medical History:        Diagnosis Date    Acid reflux     Arthritis     CAD (coronary artery disease)     Enlarged prostate     Flores catheter in place     Hyperlipidemia     Hypertension        Past Surgical History:        Procedure Laterality Date    ARM SURGERY Right     RESET    BACK SURGERY      L5-GANGLION CYST    BUNIONECTOMY Bilateral     CATARACT REMOVAL WITH IMPLANT Bilateral     CERVICAL SPINE SURGERY      C2    COLONOSCOPY      CORONARY ANGIOPLASTY         Social History:    Social History     Tobacco Use    Smoking status: Former     Packs/day: 1.00     Years: 40.00     Pack years: 40.00     Types: Cigarettes     Quit date: 1980     Years since quittin.0    Smokeless tobacco: Never   Substance Use Topics    Alcohol use: Yes     Comment: OCCAS                                Counseling given: Not Answered      Vital Signs (Current):   Vitals:    22 1140 22 1157 22 1203   BP:  113/72    Pulse:  60    Resp:  18    Temp:  98 °F (36.7 °C)    TempSrc:  Temporal    SpO2:  96%    Weight: 150 lb (68 kg)  163 lb (73.9 kg)   Height: 5' 9\" (1.753 m)                                                BP Readings from Last 3 Encounters:   22 113/72   22 (!) 158/85       NPO Status: Time of last liquid consumption: 2300                        Time of last solid consumption: 2300                        Date of last liquid consumption: 12/20/22                        Date of last solid food consumption: 12/20/22    BMI:   Wt Readings from Last 3 Encounters:   12/21/22 163 lb (73.9 kg)   11/22/22 161 lb 13.1 oz (73.4 kg)     Body mass index is 24.07 kg/m². CBC:   Lab Results   Component Value Date/Time    WBC 9.0 12/13/2022 09:05 AM    RBC 4.37 12/13/2022 09:05 AM    HGB 12.7 12/13/2022 09:05 AM    HCT 39.4 12/13/2022 09:05 AM    MCV 90.2 12/13/2022 09:05 AM    RDW 15.0 12/13/2022 09:05 AM     12/13/2022 09:05 AM       CMP:   Lab Results   Component Value Date/Time     11/22/2022 06:47 AM    K 4.7 11/22/2022 06:47 AM     11/22/2022 06:47 AM    CO2 23 11/22/2022 06:47 AM    BUN 16 11/22/2022 06:47 AM    CREATININE 1.0 11/22/2022 06:47 AM    LABGLOM >60 11/22/2022 06:47 AM    GLUCOSE 96 11/22/2022 06:47 AM    CALCIUM 8.1 11/22/2022 06:47 AM       POC Tests: No results for input(s): POCGLU, POCNA, POCK, POCCL, POCBUN, POCHEMO, POCHCT in the last 72 hours.     Coags:   Lab Results   Component Value Date/Time    PROTIME 13.6 12/13/2022 09:05 AM    INR 1.05 12/13/2022 09:05 AM    APTT 26.1 12/13/2022 09:05 AM       HCG (If Applicable): No results found for: PREGTESTUR, PREGSERUM, HCG, HCGQUANT     ABGs: No results found for: PHART, PO2ART, STQ1CJM, DMK2FBC, BEART, X7ECLYAV     Type & Screen (If Applicable):  No results found for: LABABO, LABRH    Drug/Infectious Status (If Applicable):  No results found for: HIV, HEPCAB    COVID-19 Screening (If Applicable): No results found for: COVID19        Anesthesia Evaluation  Patient summary reviewed no history of anesthetic complications:   Airway: Mallampati: II  TM distance: >3 FB   Neck ROM: full  Mouth opening: > = 3 FB   Dental: normal exam         Pulmonary:Negative Pulmonary ROS and normal exam        (-) not a current smoker                           Cardiovascular:  Exercise tolerance: good (>4 METS),   (+) hypertension:, CAD (RCA ostial 100% occluded, per cardiologist has collaterals):, dysrhythmias (hx of vtach during LHC): ventricular tachycardia, hyperlipidemia    (-)  angina and  EWING      Rhythm: regular  Rate: normal                 ROS comment: TTE 2021:  Summary:   Overall left ventricular ejection fraction is estimated to be 55-60%. The left ventricular wall motion is normal.   Mild aortic root dilatation. The left atrium is mildly dilated. There is trace mitral regurgitation. Mild aortic regurgitation. LHC:  Findings/Summary:    CORONARY ANGIOGRAPHY FINDINGS     DOMINANCE:  Right dominant         LEFT MAIN: Luminal irregularities    20-30% distal lesion                 LEFT ANTERIOR DESCENDING:  Luminal irregularities                   LEFT CIRCUMFLEX: Luminal irregularities    50% mid lesion                 RIGHT CORONARY:  Stenotic    100% ostial lesion, grade III L to R   collaterals         LEFT VENTRICULAR FUNCTION:         LVEF: 60%         LVEDP: Normal pre-angio         LV Systolic Pressure: Normal         LV to AO Gradient: none         LV Wall Motion:  Normal,      .         MITRAL VALVE: No mitral insufficiency          Neuro/Psych:   Negative Neuro/Psych ROS              GI/Hepatic/Renal:   (+) GERD:,      (-) liver disease and no renal disease       Endo/Other: Negative Endo/Other ROS                    Abdominal:             Vascular: negative vascular ROS. Other Findings:           Anesthesia Plan      general     ASA 3     (74 yo M with PMHx of HTN, HLD, CAD, hx of vtach, GERD presents for TURP.     I discussed with the patient the risks and benefits to general anesthesia including possible anesthetic complications but not limited to: PONV, damage to the airway and surrounding structures (teeth, lips, gums, tongue, etc.), adverse reactions to medications, cardiac complications (MI, CHF, arrhythmias, etc.), respiratory complications (post-op ventilation, respiratory failure, etc.), neurologic complications (nerve damage, stroke, seizure) and death. The patient was given the opportunity to ask questions and all questions were answered to the patient's satisfaction.  )  Induction: intravenous. MIPS: Postoperative opioids intended and Prophylactic antiemetics administered. Anesthetic plan and risks discussed with patient. Plan discussed with CRNA. This pre-anesthesia assessment may be used as a history and physical.    DOS STAFF ADDENDUM:    Pt seen and examined, chart reviewed (including anesthesia, drug and allergy history). No interval changes to history and physical examination. Anesthetic plan, risks, benefits, alternatives, and personnel involved discussed with patient. Patient verbalized an understanding and agrees to proceed.       Jh Ordaz MD  December 21, 2022  12:38 PM

## 2022-12-22 VITALS
DIASTOLIC BLOOD PRESSURE: 63 MMHG | OXYGEN SATURATION: 95 % | RESPIRATION RATE: 16 BRPM | BODY MASS INDEX: 24.36 KG/M2 | SYSTOLIC BLOOD PRESSURE: 112 MMHG | WEIGHT: 164.46 LBS | HEIGHT: 69 IN | HEART RATE: 72 BPM | TEMPERATURE: 97.8 F

## 2022-12-22 LAB
ANION GAP SERPL CALCULATED.3IONS-SCNC: 10 MMOL/L (ref 3–16)
BUN BLDV-MCNC: 13 MG/DL (ref 7–20)
CALCIUM SERPL-MCNC: 7.9 MG/DL (ref 8.3–10.6)
CHLORIDE BLD-SCNC: 107 MMOL/L (ref 99–110)
CO2: 22 MMOL/L (ref 21–32)
CREAT SERPL-MCNC: 0.8 MG/DL (ref 0.8–1.3)
GFR SERPL CREATININE-BSD FRML MDRD: >60 ML/MIN/{1.73_M2}
GLUCOSE BLD-MCNC: 108 MG/DL (ref 70–99)
HCT VFR BLD CALC: 35.9 % (ref 40.5–52.5)
HEMOGLOBIN: 11.6 G/DL (ref 13.5–17.5)
MCH RBC QN AUTO: 29.1 PG (ref 26–34)
MCHC RBC AUTO-ENTMCNC: 32.4 G/DL (ref 31–36)
MCV RBC AUTO: 89.7 FL (ref 80–100)
PDW BLD-RTO: 15 % (ref 12.4–15.4)
PLATELET # BLD: 240 K/UL (ref 135–450)
PMV BLD AUTO: 8.4 FL (ref 5–10.5)
POTASSIUM SERPL-SCNC: 3.9 MMOL/L (ref 3.5–5.1)
RBC # BLD: 4 M/UL (ref 4.2–5.9)
SODIUM BLD-SCNC: 139 MMOL/L (ref 136–145)
WBC # BLD: 13.6 K/UL (ref 4–11)

## 2022-12-22 PROCEDURE — 2580000003 HC RX 258: Performed by: UROLOGY

## 2022-12-22 PROCEDURE — 6370000000 HC RX 637 (ALT 250 FOR IP): Performed by: UROLOGY

## 2022-12-22 PROCEDURE — 80048 BASIC METABOLIC PNL TOTAL CA: CPT

## 2022-12-22 PROCEDURE — 36415 COLL VENOUS BLD VENIPUNCTURE: CPT

## 2022-12-22 PROCEDURE — 85027 COMPLETE CBC AUTOMATED: CPT

## 2022-12-22 RX ADMIN — POLYETHYLENE GLYCOL 3350 17 G: 17 POWDER, FOR SOLUTION ORAL at 08:11

## 2022-12-22 RX ADMIN — PANTOPRAZOLE SODIUM 40 MG: 40 TABLET, DELAYED RELEASE ORAL at 05:42

## 2022-12-22 RX ADMIN — OXYCODONE HYDROCHLORIDE 10 MG: 10 TABLET ORAL at 05:47

## 2022-12-22 RX ADMIN — GABAPENTIN 100 MG: 100 CAPSULE ORAL at 08:11

## 2022-12-22 RX ADMIN — SODIUM CHLORIDE: 9 INJECTION, SOLUTION INTRAVENOUS at 08:12

## 2022-12-22 RX ADMIN — METOPROLOL TARTRATE 50 MG: 50 TABLET ORAL at 08:11

## 2022-12-22 RX ADMIN — DOCUSATE SODIUM 50 MG AND SENNOSIDES 8.6 MG 1 TABLET: 8.6; 5 TABLET, FILM COATED ORAL at 08:11

## 2022-12-22 ASSESSMENT — PAIN DESCRIPTION - LOCATION: LOCATION: GROIN

## 2022-12-22 NOTE — DISCHARGE SUMMARY
Urology Discharge Summary    Admitting Physician: Dr. Jimmy Banegas    Date of Admission: 12/21/2022 11:33 AM    Date of Discharge: 12/22/22    Principal Diagnosis: Benign prostatic hyperplasia with lower urinary tract symptoms, symptom details unspecified [N40.1]  BPH with urinary obstruction [N40.1, N13.8]    Secondary Diagnosis:   Past Medical History:   Diagnosis Date    Acid reflux     Arthritis     CAD (coronary artery disease)     Enlarged prostate     Jacob catheter in place     Hyperlipidemia     Hypertension          Procedures Performed:   Discharge Procedure Orders   CBC   Standing Status: Future Number of Occurrences: 1 Standing Exp. Date: 12/12/23     Protime-INR   Standing Status: Future Number of Occurrences: 1 Standing Exp. Date: 12/12/23     Order Specific Question Answer Comments   Daily Coumadin Dose? UNK      APTT   Standing Status: Future Number of Occurrences: 1 Standing Exp. Date: 12/12/23     Order Specific Question Answer Comments   Daily Heparin Dose? unk      EKG 12 Lead   Standing Status: Future Number of Occurrences: 1 Standing Exp. Date: 12/12/23     Order Specific Question Answer Comments   Reason for Exam? Pre-op      Type and Screen   Standing Status: Future Number of Occurrences: 1 Standing Exp. Date: 12/12/23       Hospital Course: 77 yo male with BPH and retention admitted after uneventful TURP yesterday. Failed void trial this morning. I replaced a jacob.  He will follow up next week for repeat void trial in office    Discharge Medications:      Medication List        CONTINUE taking these medications      atorvastatin 40 MG tablet  Commonly known as: LIPITOR     Caltrate 600+D Plus Minerals 600-800 MG-UNIT Tabs tablet     docusate 100 MG Caps  Commonly known as: COLACE, DULCOLAX  Take 200 mg by mouth 2 times daily     gabapentin 100 MG capsule  Commonly known as: NEURONTIN     lisinopril 10 MG tablet  Commonly known as: PRINIVIL;ZESTRIL     metoprolol tartrate 50 MG tablet  Commonly known as: LOPRESSOR     omeprazole 40 MG delayed release capsule  Commonly known as: PRILOSEC            STOP taking these medications      aspirin 81 MG EC tablet     solifenacin 5 MG tablet  Commonly known as: VESICARE     tamsulosin 0.4 MG capsule  Commonly known as: FLOMAX     tiZANidine 4 MG tablet  Commonly known as: [address] [facility] PAIN PO              Discharge Plan: MI home with jacob in stable condition

## 2022-12-22 NOTE — PROGRESS NOTES
Patient alert and oriented upon discharge. PIV taken out without complications. Patient discharging with jacob catheter in place. Jacob draining clear, cherry urine. Discharge instructions provided to the patient.      Electronically signed by Ju Mcbride RN on 12/22/2022 at 1:06 PM Patient/Caregiver provided printed discharge information.

## 2022-12-22 NOTE — OP NOTE
[facility]           [address]                                OPERATIVE REPORT    PATIENT NAME: Maninder Hammond                 :        1945  MED REC NO:   [id number]                          ROOM:       2581  ACCOUNT NO:   [de-identified]                           ADMIT DATE: 2022  PROVIDER:     Renee Novak. Dayron Licona MD    DATE OF PROCEDURE:  2022    PREOPERATIVE DIAGNOSES:  BPH with lower urinary tract symptoms and  urinary retention. POSTOPERATIVE DIAGNOSES:  BPH with lower urinary tract symptoms and  urinary retention. OPERATION PERFORMED:   Cystoscopy, transurethral resection of prostate. SURGEON:  Renee Novak. Dayron Licona MD    ANESTHESIA:  General.    FINDINGS:  Trilobar hyperplasia. Highly trabeculated bladder. Urinary  retention. DRAINS:  24-Mal[place] three-way Flores catheter. SPECIMEN:  Prostate chips. ESTIMATED BLOOD LOSS:  Minimal.    COMPLICATIONS:  None immediate. DISPOSITION:  Stable to Recovery. Admit to floor for routine  postoperative care. INDICATION FOR PROCEDURE:  The patient is a 64-year-old patient who I  have been following for years who has had worsening lower urinary tract  symptoms and recently actually just went into retention. He has failed  a void trial after being admitted for acute kidney injury and we  recommended moving ahead with TURP and he presents now for that  procedure. OPERATIVE PROCEDURE:  The patient was properly identified in the  preoperative area and taken to the operating room and placed on the  table in supine position. General anesthesia was induced and the  patient was placed in the dorsal lithotomy position. He was prepped and  draped in a standard fashion. Antibiotics were given and a time-out was  performed. Rigid cystoscope was placed through the urethra and into the bladder. The bladder was surveyed.   It was noted to have several large and small  trabeculations and diverticula throughout it. This was likely secondary  to obstruction from a very enlarged prostate with trilobar hyperplasia  with a large median lobe component. I switched to the bipolar  resectoscope. The visual obturator was used to place the scope and then  the resection sheath was placed. I resected through the median lobe  portion first.  It was certainly significant although not quite as large  as I had thought on initial inspection. I carried this down from the  intravesical component, passed the bladder neck and to the verumontanum  on the posterior aspect of the prostate. I then performed systematic resection of the lateral lobes of the  prostate starting on the patient's left hand side, resecting down to  capsule in most areas and then pinching off the apical tissue at the  end. The same was done on the patient's right hand side. The chips  were removed and I repeated another modest amount of resection. I  suspect the total resection volume about 25 to 30 mL. Specimens were  collected and sent for pathologic analysis. Excellent hemostasis was  achieved. Bilateral ureteral orifices were noted before the case and at  the end and were uninjured and away from the bladder neck. Scope was  removed and a 24-Yi three-way Flores catheter was placed. Continuous  bladder irrigation was started. B and O suppository was placed. The  patient was awoken from anesthesia and taken to recovery room in good  condition. He will be admitted to the floor for continuous bladder  irrigation and have a void trial tomorrow.         Genia Duran MD    D: 12/21/2022 14:37:41       T: 12/21/2022 16:13:46     [email or web address]  Job#: [id number]     Doc#: [id number]    CC:

## 2022-12-22 NOTE — CARE COORDINATION
CASE MANAGEMENT DISCHARGE SUMMARY:    DISCHARGE DATE: 12/22/22    DISCHARGED TO HOME     TRANSPORTATION: wife    Denied needs.     Electronically signed by Ladarius Lentz RN on 12/22/2022 at 1:25 PM

## 2022-12-22 NOTE — PLAN OF CARE
Problem: Discharge Planning  Goal: Discharge to home or other facility with appropriate resources  Outcome: Progressing     Problem: Pain  Goal: Verbalizes/displays adequate comfort level or baseline comfort level  12/22/2022 0217 by Viry Morales RN  Outcome: Progressing     Problem: Safety - Adult  Goal: Free from fall injury  12/22/2022 0217 by Viry Morales RN  Outcome: Progressing

## 2022-12-22 NOTE — ANESTHESIA POSTPROCEDURE EVALUATION
Department of Anesthesiology  Postprocedure Note    Patient: Estefanía Briones  MRN: [id number]  YOB: 1945  Date of evaluation: 12/22/2022      Procedure Summary     Date: 12/21/22 Room / Location: [address] [place] [address]    Anesthesia Start: 0813 Anesthesia Stop: 9777    Procedure: CYSTOSCOPY TRANSURETHRAL RESECTION OF PROSTATE (Bladder) Diagnosis:       Benign prostatic hyperplasia with lower urinary tract symptoms, symptom details unspecified      (ENLARGED PROSTATE WITH URINARY SYMPTOMS)    Surgeons: Margarita Mayes MD Responsible Provider: Phillip Barnd MD    Anesthesia Type: general ASA Status: 3          Anesthesia Type: No value filed. Flori Phase I: Flori Score: 10    Flori Phase II:        Anesthesia Post Evaluation    Patient location during evaluation: PACU  Patient participation: complete - patient participated  Level of consciousness: awake  Airway patency: patent  Nausea & Vomiting: no nausea and no vomiting  Cardiovascular status: blood pressure returned to baseline  Respiratory status: acceptable  Hydration status: stable  Comments: Vital signs stable  OK to discharge from Stage I post anesthesia care.   Care transferred from Anesthesiology department on discharge from perioperative area   Multimodal analgesia pain management approach

## 2022-12-22 NOTE — DISCHARGE INSTRUCTIONS
DC home with jacob  Hold Aspirin until follow up  Follow up next week for jacob removal  Give leg bag in addition to overnight jacob bag  No heavy lifting

## 2022-12-22 NOTE — CARE COORDINATION
12/22/22 1116   Readmission Assessment   Number of Days since last admission? 8-30 days   Previous Disposition Home with Family   Who is being Interviewed Patient   What was the patient's/caregiver's perception as to why they think they needed to return back to the hospital? Other (Comment)  (planned readmit for surgery)   Did you visit your Primary Care Physician after you left the hospital, before you returned this time? No   Why weren't you able to visit your PCP? Did not have an appointment   Did you see a specialist, such as Cardiac, Pulmonary, Orthopedic Physician, etc. after you left the hospital? Yes   Who advised the patient to return to the hospital? Physician  (planned surgery)   Does the patient report anything that got in the way of taking their medications?  No     Radha Hdez RN, BSN,   [phone]  Electronically signed by Radha Hdez RN on 12/22/2022 at 11:17 AM

## 2022-12-22 NOTE — PLAN OF CARE
Problem: Discharge Planning  Goal: Discharge to home or other facility with appropriate resources  12/22/2022 1003 by Kaleb Lindsey RN  Outcome: Progressing  Flowsheets (Taken 12/22/2022 1353)  Discharge to home or other facility with appropriate resources:   Identify barriers to discharge with patient and caregiver   Arrange for needed discharge resources and transportation as appropriate   Identify discharge learning needs (meds, wound care, etc)   Refer to discharge planning if patient needs post-hospital services based on physician order or complex needs related to functional status, cognitive ability or social support system   Arrange for interpreters to assist at discharge as needed  12/22/2022 0217 by Jaswant Willingham RN  Outcome: Progressing     Problem: Pain  Goal: Verbalizes/displays adequate comfort level or baseline comfort level  12/22/2022 1003 by Kaleb Lindsey RN  Outcome: Progressing  12/22/2022 0217 by Jaswant Willingham RN  Outcome: Progressing     Problem: Safety - Adult  Goal: Free from fall injury  12/22/2022 1003 by Kaleb Lindsey RN  Outcome: Progressing  12/22/2022 0217 by Jaswant Willingham RN  Outcome: Progressing

## 2022-12-22 NOTE — DISCHARGE INSTR - DIET

## 2024-06-23 ENCOUNTER — HOSPITAL ENCOUNTER (EMERGENCY)
Age: 79
Discharge: HOME | End: 2024-06-23
Attending: EMERGENCY MEDICINE
Payer: MEDICARE

## 2024-06-23 VITALS
TEMPERATURE: 97.8 F | BODY MASS INDEX: 23.58 KG/M2 | DIASTOLIC BLOOD PRESSURE: 75 MMHG | HEART RATE: 88 BPM | SYSTOLIC BLOOD PRESSURE: 127 MMHG | HEIGHT: 69 IN | RESPIRATION RATE: 18 BRPM | OXYGEN SATURATION: 100 % | WEIGHT: 159.17 LBS

## 2024-06-23 DIAGNOSIS — R33.9 URINARY RETENTION: Primary | ICD-10-CM

## 2024-06-23 LAB
ALBUMIN SERPL-MCNC: 3.6 G/DL (ref 3.4–5)
ALP SERPL-CCNC: 105 U/L (ref 40–129)
ALT SERPL-CCNC: 6 U/L (ref 10–40)
ANION GAP SERPL CALCULATED.3IONS-SCNC: 11 MMOL/L (ref 3–16)
AST SERPL-CCNC: 11 U/L (ref 15–37)
BACTERIA URNS QL MICRO: ABNORMAL /HPF
BASOPHILS # BLD: 0.1 K/UL (ref 0–0.2)
BASOPHILS NFR BLD: 0.6 %
BILIRUB DIRECT SERPL-MCNC: <0.2 MG/DL (ref 0–0.3)
BILIRUB INDIRECT SERPL-MCNC: ABNORMAL MG/DL (ref 0–1)
BILIRUB SERPL-MCNC: <0.2 MG/DL (ref 0–1)
BILIRUB UR QL STRIP.AUTO: NEGATIVE
BUN SERPL-MCNC: 14 MG/DL (ref 7–20)
CALCIUM SERPL-MCNC: 8.3 MG/DL (ref 8.3–10.6)
CHLORIDE SERPL-SCNC: 106 MMOL/L (ref 99–110)
CLARITY UR: ABNORMAL
CO2 SERPL-SCNC: 21 MMOL/L (ref 21–32)
COLOR UR: YELLOW
CREAT SERPL-MCNC: 0.9 MG/DL (ref 0.8–1.3)
DEPRECATED RDW RBC AUTO: 15.3 % (ref 12.4–15.4)
EOSINOPHIL # BLD: 0.1 K/UL (ref 0–0.6)
EOSINOPHIL NFR BLD: 1.3 %
EPI CELLS #/AREA URNS AUTO: 1 /HPF (ref 0–5)
GFR SERPLBLD CREATININE-BSD FMLA CKD-EPI: 86 ML/MIN/{1.73_M2}
GLUCOSE SERPL-MCNC: 99 MG/DL (ref 70–99)
GLUCOSE UR STRIP.AUTO-MCNC: NEGATIVE MG/DL
HCT VFR BLD AUTO: 24.7 % (ref 40.5–52.5)
HGB BLD-MCNC: 8 G/DL (ref 13.5–17.5)
HGB UR QL STRIP.AUTO: ABNORMAL
HYALINE CASTS #/AREA URNS AUTO: 2 /LPF (ref 0–8)
KETONES UR STRIP.AUTO-MCNC: NEGATIVE MG/DL
LEUKOCYTE ESTERASE UR QL STRIP.AUTO: ABNORMAL
LIPASE SERPL-CCNC: 36 U/L (ref 13–60)
LYMPHOCYTES # BLD: 1.7 K/UL (ref 1–5.1)
LYMPHOCYTES NFR BLD: 19.2 %
MCH RBC QN AUTO: 26.7 PG (ref 26–34)
MCHC RBC AUTO-ENTMCNC: 32.2 G/DL (ref 31–36)
MCV RBC AUTO: 82.8 FL (ref 80–100)
MONOCYTES # BLD: 1.1 K/UL (ref 0–1.3)
MONOCYTES NFR BLD: 12.1 %
NEUTROPHILS # BLD: 6 K/UL (ref 1.7–7.7)
NEUTROPHILS NFR BLD: 66.8 %
NITRITE UR QL STRIP.AUTO: NEGATIVE
PH UR STRIP.AUTO: 5.5 [PH] (ref 5–8)
PLATELET # BLD AUTO: 410 K/UL (ref 135–450)
PMV BLD AUTO: 6.8 FL (ref 5–10.5)
POTASSIUM SERPL-SCNC: 4 MMOL/L (ref 3.5–5.1)
PROT SERPL-MCNC: 6.6 G/DL (ref 6.4–8.2)
PROT UR STRIP.AUTO-MCNC: ABNORMAL MG/DL
RBC # BLD AUTO: 2.99 M/UL (ref 4.2–5.9)
RBC CLUMPS #/AREA URNS AUTO: 72 /HPF (ref 0–4)
SODIUM SERPL-SCNC: 138 MMOL/L (ref 136–145)
SP GR UR STRIP.AUTO: 1.02 (ref 1–1.03)
UA COMPLETE W REFLEX CULTURE PNL UR: ABNORMAL
UA DIPSTICK W REFLEX MICRO PNL UR: YES
URN SPEC COLLECT METH UR: ABNORMAL
UROBILINOGEN UR STRIP-ACNC: 0.2 E.U./DL
WBC # BLD AUTO: 8.9 K/UL (ref 4–11)
WBC #/AREA URNS AUTO: 4 /HPF (ref 0–5)

## 2024-06-23 PROCEDURE — 36415 COLL VENOUS BLD VENIPUNCTURE: CPT

## 2024-06-23 PROCEDURE — 2580000003 HC RX 258

## 2024-06-23 PROCEDURE — C1769 GUIDE WIRE: HCPCS

## 2024-06-23 PROCEDURE — 81001 URINALYSIS AUTO W/SCOPE: CPT

## 2024-06-23 PROCEDURE — 6360000002 HC RX W HCPCS

## 2024-06-23 PROCEDURE — 80076 HEPATIC FUNCTION PANEL: CPT

## 2024-06-23 PROCEDURE — C1713 ANCHOR/SCREW BN/BN,TIS/BN: HCPCS

## 2024-06-23 PROCEDURE — 99284 EMERGENCY DEPT VISIT MOD MDM: CPT

## 2024-06-23 PROCEDURE — 80048 BASIC METABOLIC PNL TOTAL CA: CPT

## 2024-06-23 PROCEDURE — 85025 COMPLETE CBC W/AUTO DIFF WBC: CPT

## 2024-06-23 PROCEDURE — 51702 INSERT TEMP BLADDER CATH: CPT

## 2024-06-23 PROCEDURE — 83690 ASSAY OF LIPASE: CPT

## 2024-06-23 PROCEDURE — 51798 US URINE CAPACITY MEASURE: CPT

## 2024-06-23 PROCEDURE — 96374 THER/PROPH/DIAG INJ IV PUSH: CPT

## 2024-06-23 RX ORDER — CEPHALEXIN 500 MG/1
500 CAPSULE ORAL 2 TIMES DAILY
Qty: 14 CAPSULE | Refills: 0 | Status: SHIPPED | OUTPATIENT
Start: 2024-06-23 | End: 2024-06-30

## 2024-06-23 RX ORDER — LIDOCAINE HYDROCHLORIDE 20 MG/ML
JELLY TOPICAL ONCE
Status: DISCONTINUED | OUTPATIENT
Start: 2024-06-23 | End: 2024-06-23 | Stop reason: HOSPADM

## 2024-06-23 RX ORDER — LIDOCAINE HYDROCHLORIDE 20 MG/ML
JELLY TOPICAL PRN
Status: DISCONTINUED | OUTPATIENT
Start: 2024-06-23 | End: 2024-06-23 | Stop reason: HOSPADM

## 2024-06-23 RX ORDER — FENTANYL CITRATE 50 UG/ML
25 INJECTION, SOLUTION INTRAMUSCULAR; INTRAVENOUS ONCE
Status: COMPLETED | OUTPATIENT
Start: 2024-06-23 | End: 2024-06-23

## 2024-06-23 RX ADMIN — FENTANYL CITRATE 25 MCG: 50 INJECTION INTRAMUSCULAR; INTRAVENOUS at 11:20

## 2024-06-23 RX ADMIN — WATER 1000 MG: 1 INJECTION INTRAMUSCULAR; INTRAVENOUS; SUBCUTANEOUS at 12:06

## 2024-06-23 RX ADMIN — FENTANYL CITRATE 25 MCG: 50 INJECTION INTRAMUSCULAR; INTRAVENOUS at 12:06

## 2024-06-23 ASSESSMENT — PAIN - FUNCTIONAL ASSESSMENT: PAIN_FUNCTIONAL_ASSESSMENT: NONE - DENIES PAIN

## 2024-06-23 ASSESSMENT — PAIN DESCRIPTION - LOCATION: LOCATION: PERINEUM

## 2024-06-23 ASSESSMENT — PAIN SCALES - GENERAL: PAINLEVEL_OUTOF10: 1

## 2024-06-23 NOTE — DISCHARGE INSTRUCTIONS
Please follow-up with your urologist and your family doctor as discussed.  Return to ED for any worsening symptoms as discussed

## 2024-06-23 NOTE — ED NOTES

## 2024-06-23 NOTE — ED PROVIDER NOTES
Emergency Department Attending Provider Note  Location: [facility] EMERGENCY DEPARTMENT  6/23/2024   Note Started: 11:10 AM EDT 6/23/24     THIS IS MY PK SUPERVISORY AND SHARED VISIT NOTE:    Patient Identification  Connor Smallwood is a 78 y.o. male      HPI:Connor Smallwood was evaluated in the Emergency Department for difficulty urinating that started at 3 AM last night.  Patient states he has been unable to reduce any urine and normally urinates every 2 hours.  Patient states he does have a history of BPH and had TURP 3 to 4 years ago with Dr. Cowan.  Patient denies any fevers or chills.  No nausea or vomiting.  Does have suprapubic pressure.  Normal bowel movements.  Patient states that when he had open heart surgery they had difficult time placing a Flores catheter and urology had to place the catheter.    Although initial history and physical exam information was obtained by PK/NPP (who also dictated a record of this visit), I personally saw the patient and made/approved the management plan and take responsibility for the patient management.       PHYSICAL EXAM:     CONSTITUTIONAL: AOx4, NAD, cooperative with exam, afebrile   HEAD: normocephalic, atraumatic   EYES: PERRL, EOMI, anicteric sclera   ENT: Moist mucous membranes   BACK: Symmetric, no deformity, no CVA tenderness   LUNGS: Bilateral breath sounds, CTAB, no rales/ronchi/wheezes   CARDIOVASCULAR: RRR, normal S1/S2, no m/r/g, 2+ pulses throughout   ABDOMEN: Soft, distended bladder with mild suprapubic tenderness, no rebound tenderness or guarding, no palpable masses, no rigidity,  +BS   NEUROLOGIC:  MAEx4, GCS 15   MUSCULOSKELETAL: No clubbing, cyanosis or edema   SKIN: No rash, pallor or wounds         EKG Interpretation  None    Patient seen and evaluated.  Relevant records reviewed.  MDM  Patient presents for urinary retention.  History of BPH.  Patient does have 571 mL of urine in the bladder on bladder scan.  Multiple attempts 
weight. He is not ill-appearing or diaphoretic.   HENT:      Head: Normocephalic and atraumatic.      Right Ear: External ear normal.      Left Ear: External ear normal.      Nose: Nose normal. No congestion or rhinorrhea.      Mouth/Throat:      Mouth: Mucous membranes are moist.      Pharynx: Oropharynx is clear. No oropharyngeal exudate or posterior oropharyngeal erythema.   Eyes:      General: No scleral icterus.        Right eye: No discharge.         Left eye: No discharge.      Extraocular Movements: Extraocular movements intact.      Conjunctiva/sclera: Conjunctivae normal.      Pupils: Pupils are equal, round, and reactive to light.   Cardiovascular:      Rate and Rhythm: Normal rate and regular rhythm.      Pulses: Normal pulses.      Heart sounds: Normal heart sounds. No murmur heard.     No friction rub. No gallop.   Pulmonary:      Effort: Pulmonary effort is normal. No respiratory distress.      Breath sounds: Normal breath sounds. No stridor. No wheezing, rhonchi or rales.   Abdominal:      General: Abdomen is flat. Bowel sounds are normal. There is no distension.      Palpations: Abdomen is soft.      Tenderness: There is no abdominal tenderness. There is no right CVA tenderness, left CVA tenderness or guarding.   Genitourinary:     Penis: Normal and circumcised.    Musculoskeletal:         General: Normal range of motion.      Cervical back: Normal range of motion and neck supple. No rigidity.   Lymphadenopathy:      Cervical: No cervical adenopathy.   Skin:     General: Skin is warm and dry.      Capillary Refill: Capillary refill takes less than 2 seconds.      Coloration: Skin is not jaundiced or pale.      Findings: No bruising or rash.   Neurological:      General: No focal deficit present.      Mental Status: He is alert and oriented to person, place, and time. Mental status is at baseline.   Psychiatric:         Mood and Affect: Mood normal.         Behavior: Behavior normal.

## 2024-06-23 NOTE — ED NOTES
3 unsuccessful jacob placements by Tonio LUTHER with a 16fr jacob, Rigoberto RN with a 14fr coude, and Raul Pugh NP with a 16fr coude. MD notified. Pt calm, relaxed, and cooperative and is tolerating the procedure well.

## 2024-06-23 NOTE — CONSULTS
Urology    Patient known to urology status post prior TURP with a recurring bulbar urethral stricture.    He presented to the emergency room today and the staff after several attempts were unsuccessful placing a Flores catheter for retention with a residual volume of 600 mL    The patient received IV antibiotics and IV sedation.    The penis was prepped and draped in usual sterile fashion.  2% lidocaine jelly was injected into the urethra and then after 10 minutes flexible cystoscopy was performed.  He had a false passage posteriorly.  There was a very small approximately 8 Palauan lumen identified anteriorly.  With cystoscopic guidance a guidewire was successfully manipulated through the stricture and coiled in the bladder.  Sequential dilators were used up to 18 Palauan.  Significant resistance was encountered trying to pass the 20 Palauan dilator.    After the dilation flexible cystoscopy was repeated.  The scope was advanced up into the bladder.  He had a very distended bladder.  The urothelium was unremarkable.  The bladder is trabeculated with cellules.  The prostate gland has been well resected and is wide open.  The membranous urethra was normal.    A 16 Palauan coudé catheter was advanced over the guidewire up into the bladder without difficulty.    The emergency room staff will discharge him on Keflex and he will follow-up in the office to see Dr. Cowan.

## 2024-06-23 NOTE — ED TRIAGE NOTES
Hasn't urinated since 0300, normally urinates every 2 hours. Recent history of open heart, recently taken off lasix, TURP 3-4 years ago.

## 2024-07-24 NOTE — PROGRESS NOTES
Connor Smallwood    Age 78 y.o.    male    1945    MRN [id number]    7/31/2024  Arrival Time_____________  OR Time____________50 Min     Procedure(s):  CYSTOSCOPY, URETHRAL DILATION WITH DRUG COATED BALLOON                     LABORIE                      General   Surgeon(s):  Sangita Marsh, MD      DAY ADMIT ___  SDS/OP ___  OUTPT IN BED ___        Phone [phone] (home)                  PCP _____________________ Phone_________________ Epic ( ) Epic CE ( ) Appt ________    NOTES: _________________________________________ Consult/Cardio _______________    ____________________________________________________________________________    ____________________________________________________________________________  PAT APPT DATE:________ TIME: ________  FAXED QAD: _______  (__) H&P w/ Hospitalist    (__) PAT orders in EPIC    (__) Meet with PAT nurse  __________________________________________________________________________  Preop Nurse phone screen complete: _____________  (__) CBC     (__) W/ DIFF ___________  (__) CT CHEST  __________   (__) Hgb A1C    ___________  (__) CHEST X RAY   __________  (__) LIPID PROFILE  ___________  (__) EKG   __________  (__) PT-INR / APTT  ___________  (__) PFT's   __________  (__) BMP   ___________  (__) CAROTIDS  __________  (__) CMP   ___________  (__) VEIN MAPPING  __________  (__) U/A   ___________  ( X ) HISTORY & PHYSICAL __________  (__) URINE C & S  ___________  (__) CARDIAC CLEARANCE __________  (__) U/A W/ FLEX  ___________  (__) PULM. CLEARANCE __________  (__) SERUM PREGNANCY ___________  (__) Preop Orders in EPIC __________  (__) TYPE & SCREEN __________repeat ( ) (__)  __________________ __________  (__) Albumin   ___________  (__)  __________________ __________  (__) TRANSFERRIN  ___________  (__)  __________________ __________  (__) LIVER PROFILE  ___________  (__) URINE PREG DOS __________  (__) MRSA NASAL SWAB ___________  (__) BLOOD SUGAR

## 2024-07-25 RX ORDER — ASPIRIN 81 MG
100 TABLET, DELAYED RELEASE (ENTERIC COATED) ORAL 2 TIMES DAILY
COMMUNITY

## 2024-07-25 RX ORDER — LIDOCAINE 4 G/G
1 PATCH TOPICAL DAILY PRN
COMMUNITY

## 2024-07-25 RX ORDER — TIZANIDINE 4 MG/1
4 TABLET ORAL EVERY 8 HOURS PRN
COMMUNITY

## 2024-07-25 RX ORDER — CALCIUM CARBONATE 500 MG/1
1 TABLET, CHEWABLE ORAL DAILY PRN
COMMUNITY

## 2024-07-25 RX ORDER — ACETAMINOPHEN 325 MG/1
650 TABLET ORAL EVERY 6 HOURS PRN
COMMUNITY

## 2024-07-25 RX ORDER — ASPIRIN 325 MG
325 TABLET, DELAYED RELEASE (ENTERIC COATED) ORAL DAILY
COMMUNITY

## 2024-07-25 RX ORDER — M-VIT,TX,IRON,MINS/CALC/FOLIC 27MG-0.4MG
1 TABLET ORAL DAILY
COMMUNITY

## 2024-07-25 RX ORDER — NITROGLYCERIN 0.4 MG/1
0.4 TABLET SUBLINGUAL EVERY 5 MIN PRN
COMMUNITY

## 2024-07-26 NOTE — PROGRESS NOTES
Surgery Date and Time:  7/31/24 @ 07:30 am    Arrival Time:   06:00 am    The instructions given when and if a patient needs to stop oral intake prior to surgery varies. Follow the instructions you were given by your    Surgeon or RN during the Pre-op call.       __X__Nothing to eat or to drink after Midnight the night before the surgery. NO gum, mints, candy or ice chips day of surgery.                  Only take the following medications with a small sip of water the morning of surgery: gabapentin                 Hold the following medications (per anesthesia or surgeon request):  lisinopril         Last Dose:7/29/24 pm       Aspirin, Ibuprofen, Advil, Naproxen, Vitamin E and other Anti-inflammatory products and supplements should be stopped for 5 -7days before surgery      or as directed by your physician. Patient does not need to hold aspirin for surgery per surgeon's office.      - Do not smoke or vape, and do not drink any alcoholic beverages 24 hours prior to surgery, this includes NA Beer. Refrain from using any recreational drugs,     including non-prescribed prescription drugs.     -You may brush your teeth and gargle the morning of surgery.  DO NOT SWALLOW WATER.    -You MUST plan for a responsible adult to stay on site while you are here and take you home after your surgery. You will not be allowed to leave alone or drive               yourself home. It is requested someone stay with you the first 24 hrs. Your surgery will be cancelled if you do not have a ride home with a responsible adult.    -A parent/legal guardian must accompany a child scheduled for surgery and plan to stay at the hospital until the child is discharged.  Please do not bring other                children with you.    -Please wear simple, loose-fitting clothing to the hospital. Do not bring valuables (money, credit cards, checkbooks, etc.) Do not wear any makeup (including                no eye makeup) and no nail polish if

## 2024-07-30 ENCOUNTER — ANESTHESIA EVENT (OUTPATIENT)
Dept: OPERATING ROOM | Age: 79
End: 2024-07-30
Payer: MEDICARE

## 2024-07-31 ENCOUNTER — ANESTHESIA (OUTPATIENT)
Dept: OPERATING ROOM | Age: 79
End: 2024-07-31
Payer: MEDICARE

## 2024-07-31 ENCOUNTER — APPOINTMENT (OUTPATIENT)
Dept: GENERAL RADIOLOGY | Age: 79
End: 2024-07-31
Attending: UROLOGY
Payer: MEDICARE

## 2024-07-31 ENCOUNTER — HOSPITAL ENCOUNTER (OUTPATIENT)
Age: 79
Setting detail: OUTPATIENT SURGERY
Discharge: HOME | End: 2024-07-31
Attending: UROLOGY | Admitting: UROLOGY
Payer: MEDICARE

## 2024-07-31 VITALS
SYSTOLIC BLOOD PRESSURE: 126 MMHG | OXYGEN SATURATION: 100 % | TEMPERATURE: 97.6 F | HEIGHT: 69 IN | WEIGHT: 152.3 LBS | BODY MASS INDEX: 22.56 KG/M2 | HEART RATE: 128 BPM | DIASTOLIC BLOOD PRESSURE: 80 MMHG | RESPIRATION RATE: 29 BRPM

## 2024-07-31 LAB
ANION GAP SERPL CALCULATED.3IONS-SCNC: 10 MMOL/L (ref 3–16)
BUN SERPL-MCNC: 22 MG/DL (ref 7–20)
CALCIUM SERPL-MCNC: 8.9 MG/DL (ref 8.3–10.6)
CHLORIDE SERPL-SCNC: 104 MMOL/L (ref 99–110)
CO2 SERPL-SCNC: 22 MMOL/L (ref 21–32)
CREAT SERPL-MCNC: 1 MG/DL (ref 0.8–1.3)
GFR SERPLBLD CREATININE-BSD FMLA CKD-EPI: 76 ML/MIN/{1.73_M2}
GLUCOSE SERPL-MCNC: 113 MG/DL (ref 70–99)
POTASSIUM SERPL-SCNC: 3.9 MMOL/L (ref 3.5–5.1)
SODIUM SERPL-SCNC: 136 MMOL/L (ref 136–145)

## 2024-07-31 PROCEDURE — 7100000010 HC PHASE II RECOVERY - FIRST 15 MIN: Performed by: UROLOGY

## 2024-07-31 PROCEDURE — 2580000003 HC RX 258: Performed by: UROLOGY

## 2024-07-31 PROCEDURE — 3600000013 HC SURGERY LEVEL 3 ADDTL 15MIN: Performed by: UROLOGY

## 2024-07-31 PROCEDURE — 2580000003 HC RX 258: Performed by: ANESTHESIOLOGY

## 2024-07-31 PROCEDURE — 7100000011 HC PHASE II RECOVERY - ADDTL 15 MIN: Performed by: UROLOGY

## 2024-07-31 PROCEDURE — 3600000003 HC SURGERY LEVEL 3 BASE: Performed by: UROLOGY

## 2024-07-31 PROCEDURE — 7100000001 HC PACU RECOVERY - ADDTL 15 MIN: Performed by: UROLOGY

## 2024-07-31 PROCEDURE — 3700000001 HC ADD 15 MINUTES (ANESTHESIA): Performed by: UROLOGY

## 2024-07-31 PROCEDURE — 2709999900 HC NON-CHARGEABLE SUPPLY: Performed by: UROLOGY

## 2024-07-31 PROCEDURE — 7100000000 HC PACU RECOVERY - FIRST 15 MIN: Performed by: UROLOGY

## 2024-07-31 PROCEDURE — C1769 GUIDE WIRE: HCPCS | Performed by: UROLOGY

## 2024-07-31 PROCEDURE — 3700000000 HC ANESTHESIA ATTENDED CARE: Performed by: UROLOGY

## 2024-07-31 PROCEDURE — 6360000002 HC RX W HCPCS: Performed by: NURSE ANESTHETIST, CERTIFIED REGISTERED

## 2024-07-31 PROCEDURE — 80048 BASIC METABOLIC PNL TOTAL CA: CPT

## 2024-07-31 PROCEDURE — 74018 RADEX ABDOMEN 1 VIEW: CPT

## 2024-07-31 PROCEDURE — 2720000010 HC SURG SUPPLY STERILE: Performed by: UROLOGY

## 2024-07-31 PROCEDURE — 2500000003 HC RX 250 WO HCPCS: Performed by: NURSE ANESTHETIST, CERTIFIED REGISTERED

## 2024-07-31 PROCEDURE — 6360000002 HC RX W HCPCS: Performed by: UROLOGY

## 2024-07-31 RX ORDER — LIDOCAINE HYDROCHLORIDE 20 MG/ML
INJECTION, SOLUTION EPIDURAL; INFILTRATION; INTRACAUDAL; PERINEURAL PRN
Status: DISCONTINUED | OUTPATIENT
Start: 2024-07-31 | End: 2024-07-31 | Stop reason: SDUPTHER

## 2024-07-31 RX ORDER — FUROSEMIDE 20 MG/1
20 TABLET ORAL DAILY
COMMUNITY

## 2024-07-31 RX ORDER — SODIUM CHLORIDE 9 MG/ML
INJECTION, SOLUTION INTRAVENOUS PRN
Status: DISCONTINUED | OUTPATIENT
Start: 2024-07-31 | End: 2024-07-31 | Stop reason: HOSPADM

## 2024-07-31 RX ORDER — SODIUM CHLORIDE 0.9 % (FLUSH) 0.9 %
5-40 SYRINGE (ML) INJECTION PRN
Status: DISCONTINUED | OUTPATIENT
Start: 2024-07-31 | End: 2024-07-31 | Stop reason: HOSPADM

## 2024-07-31 RX ORDER — ONDANSETRON 2 MG/ML
4 INJECTION INTRAMUSCULAR; INTRAVENOUS
Status: DISCONTINUED | OUTPATIENT
Start: 2024-07-31 | End: 2024-07-31 | Stop reason: HOSPADM

## 2024-07-31 RX ORDER — DIPHENHYDRAMINE HYDROCHLORIDE 50 MG/ML
12.5 INJECTION INTRAMUSCULAR; INTRAVENOUS
Status: DISCONTINUED | OUTPATIENT
Start: 2024-07-31 | End: 2024-07-31 | Stop reason: HOSPADM

## 2024-07-31 RX ORDER — OXYCODONE HYDROCHLORIDE 5 MG/1
10 TABLET ORAL PRN
Status: DISCONTINUED | OUTPATIENT
Start: 2024-07-31 | End: 2024-07-31 | Stop reason: HOSPADM

## 2024-07-31 RX ORDER — SODIUM CHLORIDE 0.9 % (FLUSH) 0.9 %
5-40 SYRINGE (ML) INJECTION EVERY 12 HOURS SCHEDULED
Status: DISCONTINUED | OUTPATIENT
Start: 2024-07-31 | End: 2024-07-31 | Stop reason: HOSPADM

## 2024-07-31 RX ORDER — LIDOCAINE HYDROCHLORIDE 10 MG/ML
1 INJECTION, SOLUTION EPIDURAL; INFILTRATION; INTRACAUDAL; PERINEURAL
Status: DISCONTINUED | OUTPATIENT
Start: 2024-07-31 | End: 2024-07-31 | Stop reason: HOSPADM

## 2024-07-31 RX ORDER — NALOXONE HYDROCHLORIDE 0.4 MG/ML
INJECTION, SOLUTION INTRAMUSCULAR; INTRAVENOUS; SUBCUTANEOUS PRN
Status: DISCONTINUED | OUTPATIENT
Start: 2024-07-31 | End: 2024-07-31 | Stop reason: HOSPADM

## 2024-07-31 RX ORDER — PHENYLEPHRINE HCL IN 0.9% NACL 1 MG/10 ML
SYRINGE (ML) INTRAVENOUS PRN
Status: DISCONTINUED | OUTPATIENT
Start: 2024-07-31 | End: 2024-07-31 | Stop reason: SDUPTHER

## 2024-07-31 RX ORDER — MEPERIDINE HYDROCHLORIDE 50 MG/ML
12.5 INJECTION INTRAMUSCULAR; INTRAVENOUS; SUBCUTANEOUS EVERY 5 MIN PRN
Status: DISCONTINUED | OUTPATIENT
Start: 2024-07-31 | End: 2024-07-31 | Stop reason: HOSPADM

## 2024-07-31 RX ORDER — MIDAZOLAM HYDROCHLORIDE 1 MG/ML
2 INJECTION INTRAMUSCULAR; INTRAVENOUS
Status: DISCONTINUED | OUTPATIENT
Start: 2024-07-31 | End: 2024-07-31 | Stop reason: HOSPADM

## 2024-07-31 RX ORDER — OXYCODONE HYDROCHLORIDE 5 MG/1
5 TABLET ORAL PRN
Status: DISCONTINUED | OUTPATIENT
Start: 2024-07-31 | End: 2024-07-31 | Stop reason: HOSPADM

## 2024-07-31 RX ORDER — CEFAZOLIN SODIUM IN 0.9 % NACL 2 G/100 ML
2000 PLASTIC BAG, INJECTION (ML) INTRAVENOUS
Status: COMPLETED | OUTPATIENT
Start: 2024-07-31 | End: 2024-07-31

## 2024-07-31 RX ORDER — PROPOFOL 10 MG/ML
INJECTION, EMULSION INTRAVENOUS PRN
Status: DISCONTINUED | OUTPATIENT
Start: 2024-07-31 | End: 2024-07-31 | Stop reason: SDUPTHER

## 2024-07-31 RX ORDER — LABETALOL HYDROCHLORIDE 5 MG/ML
5 INJECTION, SOLUTION INTRAVENOUS EVERY 10 MIN PRN
Status: DISCONTINUED | OUTPATIENT
Start: 2024-07-31 | End: 2024-07-31 | Stop reason: HOSPADM

## 2024-07-31 RX ORDER — SODIUM CHLORIDE, SODIUM LACTATE, POTASSIUM CHLORIDE, CALCIUM CHLORIDE 600; 310; 30; 20 MG/100ML; MG/100ML; MG/100ML; MG/100ML
INJECTION, SOLUTION INTRAVENOUS CONTINUOUS
Status: DISCONTINUED | OUTPATIENT
Start: 2024-07-31 | End: 2024-07-31 | Stop reason: HOSPADM

## 2024-07-31 RX ORDER — ONDANSETRON 2 MG/ML
INJECTION INTRAMUSCULAR; INTRAVENOUS PRN
Status: DISCONTINUED | OUTPATIENT
Start: 2024-07-31 | End: 2024-07-31 | Stop reason: SDUPTHER

## 2024-07-31 RX ORDER — FENTANYL CITRATE 50 UG/ML
INJECTION, SOLUTION INTRAMUSCULAR; INTRAVENOUS PRN
Status: DISCONTINUED | OUTPATIENT
Start: 2024-07-31 | End: 2024-07-31 | Stop reason: SDUPTHER

## 2024-07-31 RX ADMIN — SODIUM CHLORIDE, SODIUM LACTATE, POTASSIUM CHLORIDE, AND CALCIUM CHLORIDE: .6; .31; .03; .02 INJECTION, SOLUTION INTRAVENOUS at 07:23

## 2024-07-31 RX ADMIN — ONDANSETRON 4 MG: 2 INJECTION INTRAMUSCULAR; INTRAVENOUS at 07:41

## 2024-07-31 RX ADMIN — LIDOCAINE HYDROCHLORIDE 80 MG: 20 INJECTION, SOLUTION EPIDURAL; INFILTRATION; INTRACAUDAL; PERINEURAL at 07:44

## 2024-07-31 RX ADMIN — PROPOFOL 100 MG: 10 INJECTION, EMULSION INTRAVENOUS at 07:44

## 2024-07-31 RX ADMIN — Medication 100 MCG: at 07:58

## 2024-07-31 RX ADMIN — Medication 100 MCG: at 07:56

## 2024-07-31 RX ADMIN — FENTANYL CITRATE 25 MCG: 50 INJECTION, SOLUTION INTRAMUSCULAR; INTRAVENOUS at 07:49

## 2024-07-31 RX ADMIN — Medication 2000 MG: at 07:49

## 2024-07-31 ASSESSMENT — PAIN SCALES - GENERAL
PAINLEVEL_OUTOF10: 0

## 2024-07-31 ASSESSMENT — PAIN - FUNCTIONAL ASSESSMENT: PAIN_FUNCTIONAL_ASSESSMENT: 0-10

## 2024-07-31 NOTE — OP NOTE
noted from chronic outlet obstruction and stricture. A 12 Occitan Jacob was placed with 10 cc sterile water in the balloon.  The patient taken the PACU in stable condition     Plan  Patient can remove his jacob on Saturday morning   See me back in 4-5 months      Electronically signed by Sangita Marsh MD on 7/31/2024 at 8:22 AM

## 2024-07-31 NOTE — PROGRESS NOTES
Patient meets criteria for discharge per policy. Patient with jacob catheter in place. Discharge instructions given to patient and family, verbalized understanding. PIV removed. Education provided to patient and wife regarding jacob care and removal. Patient discharged via wheelchair to the care of their family in stable condition.

## 2024-07-31 NOTE — PROGRESS NOTES
Discharge paperwork reviewed with patient and wife (Neetu). Demonstrated jacob removal. Verbalized understanding.

## 2024-07-31 NOTE — DISCHARGE INSTRUCTIONS
[address]   [facility] [address]   [phone]            [] MINDI RICKETTS MD     Specialty: Urology     Follow up: (when: in 4-5 months)    Remove jacob on Saturday morning      Some blood in urine and around jacob are expected     Restart ASA 81mg tomorrow    Restart full dose ASA 325mg on Sunday      Call 911 anytime you think you may need emergency care. For example, call if:    You passed out (lost consciousness).     You have severe trouble breathing.     You have sudden chest pain and shortness of breath, or you cough up blood.     You have severe belly pain.   Call your doctor now or seek immediate medical care if:    You are sick to your stomach or cannot keep fluids down.     Your urine is still red or you see blood clots after you have urinated several times.     You have trouble passing urine or stool, especially if you have pain or swelling in your lower belly.     You have signs of a blood clot, such as:  Pain in your calf, back of the knee, thigh, or groin.  Redness and swelling in your leg or groin.     You develop a fever of 101.0 or greater, or severe chills.     You have pain in your back just below your rib cage. This is called flank pain.       Activity: As Tolerated    Diet:  You can eat your normal diet.   Drink plenty of fluids, water perferred.   If your stomach is upset, try bland, low-fat foods like plain rice, broiled chicken, toast, and yogurt.     Taking Care of Your Bladder:  Limit or avoid alcohol.  Avoid constipation.  Include fruits, vegetables, cooked dry beans, and whole grains in your diet each day  Drink plenty of water, daily  Get at least 30 minutes of daily activity like walking.     YOU WILL HAVE SOME BLOOD IN THE URINE IF YOU HAVE A STENT PLACED - THIS IS NORMAL - PLEASE DRINK A LOT OF WATER (>2L) AND THIS SHOULD HELP    ANESTHESIA DISCHARGE INSTRUCTIONS    You are under the influence of drugs- do not drink alcohol, drive a car, operate machinery(such

## 2024-07-31 NOTE — H&P
Urology Attending H&P Note      History: This is a 78 y.o. male who presents today for operative intervention for urethral dilation     Family History, Social History, Review of Systems:  Reviewed and agreed to as per chart    Vitals:  /79   Pulse (!) 101   Temp 97.9 °F (36.6 °C) (Oral)   Resp 20   Ht 1.753 m (5' 9\")   Wt 69.1 kg (152 lb 4.8 oz)   SpO2 99%   BMI 22.49 kg/m²   Temp  Av.9 °F (36.6 °C)  Min: 97.9 °F (36.6 °C)  Max: 97.9 °F (36.6 °C)  No intake or output data in the 24 hours ending 24      Physical:  Well developed, well nourished in no acute distress  Mood indicates no abnormalities. Pt doesn’t appear depressed  Orientated to time and place  Neck is supple, trachea is midline  Respiratory effort is normal  Cardiovascular show no extremity swelling  Abdomen no masses or hernias are palpated, there is no tenderness. Liver and Spleen appear normal.  Skin show no abnormal lesions  Lymph nodes are not palpated in the inguinal, neck, or axillary area.    Labs:  WBC:    Lab Results   Component Value Date/Time    WBC 8.9 2024 12:30 PM     Hemoglobin/Hematocrit:    Lab Results   Component Value Date/Time    HGB 8.0 2024 12:30 PM    HCT 24.7 2024 12:30 PM     BMP:    Lab Results   Component Value Date/Time     2024 06:50 AM    K 3.9 2024 06:50 AM    K 4.0 2024 12:30 PM     2024 06:50 AM    CO2 22 2024 06:50 AM    BUN 22 2024 06:50 AM    CREATININE 1.0 2024 06:50 AM    CALCIUM 8.9 2024 06:50 AM    LABGLOM 76 2024 06:50 AM    LABGLOM >60 2022 06:33 AM     PT/INR:    Lab Results   Component Value Date/Time    PROTIME 13.6 2022 09:05 AM    INR 1.05 2022 09:05 AM     PTT:    Lab Results   Component Value Date/Time    APTT 26.1 2022 09:05 AM   [APTT      Impression/Plan:     -- to the OR   -- antibiotics on call  -- discussed with patient - all questions answered    Thank you

## 2024-07-31 NOTE — ANESTHESIA PRE PROCEDURE
Department of Anesthesiology  Preprocedure Note       Name:  Connor Smallwood   Age:  78 y.o.  :  1945                                          MRN:  [id number]         Date:  2024      Surgeon: Surgeon(s):  Sangita Marsh MD    Procedure: Procedure(s):  CYSTOSCOPY, URETHRAL DILATION WITH DRUG COATED BALLOON                     LABORIE    Medications prior to admission:   Prior to Admission medications    Medication Sig Start Date End Date Taking? Authorizing Provider   furosemide (LASIX) 20 MG tablet Take 1 tablet by mouth daily   Yes Alexandr Sharp MD   aspirin 325 MG EC tablet Take 1 tablet by mouth daily   Yes Alexandr Sharp MD   lidocaine (HM LIDOCAINE PATCH) 4 % external patch Place 1 patch onto the skin daily as needed (pain)   Yes Alexandr Sharp MD   acetaminophen (TYLENOL) 325 MG tablet Take 2 tablets by mouth every 6 hours as needed for Pain   Yes Alexandr Sharp MD   Docusate Sodium 100 MG TABS Take 100 mg by mouth in the morning and at bedtime  Patient not taking: Reported on 2024   Yes Alexandr Sharp MD   Multiple Vitamins-Minerals (THERAPEUTIC MULTIVITAMIN-MINERALS) tablet Take 1 tablet by mouth daily Indications: does not take consistently   Yes Alexandr Sharp MD   nitroGLYCERIN (NITROSTAT) 0.4 MG SL tablet Place 1 tablet under the tongue every 5 minutes as needed for Chest pain up to max of 3 total doses. If no relief after 1 dose, call 911.   Yes Alexandr Sharp MD   calcium carbonate (TUMS) 500 MG chewable tablet Take 1 tablet by mouth daily as needed for Heartburn   Yes Alexandr Sharp MD   tiZANidine (ZANAFLEX) 4 MG tablet Take 1 tablet by mouth every 8 hours as needed (SPASMS)   Yes Alexandr Sharp MD   atorvastatin (LIPITOR) 40 MG tablet Take 1 tablet by mouth every evening    Alexandr Sharp MD   gabapentin (NEURONTIN) 100 MG capsule Take 2 capsules by mouth in the morning and at bedtime.    Aron

## 2024-07-31 NOTE — PROGRESS NOTES
Patient arrived to PACU bay 7, phase one initiated. Placed on bedside monitor, VSS. Report obtained from OR RN and anesthesia. Patient on O2 via nasal cannula at 4L. Assessment WNL. Flores in place. Warm blankets applied. Side rails in place, will monitor patient closely.

## 2024-07-31 NOTE — PROGRESS NOTES
Patient admitted to [facility] 5 in preparation for surgery, VSS. Consent confirmed. IV inserted into left hand, LR infusing. Belongings in belonging bag in room. NPO since 2000. Family at bedside, phone number in system for text updates, call light within reach.

## 2024-08-01 NOTE — ANESTHESIA POSTPROCEDURE EVALUATION
Department of Anesthesiology  Postprocedure Note    Patient: Connor Smallwood  MRN: [id number]  YOB: 1945  Date of evaluation: 8/1/2024    Procedure Summary       Date: 07/31/24 Room / Location: [address]    Anesthesia Start: 0730 Anesthesia Stop: 0816    Procedure: CYSTOSCOPY, URETHRAL DILATION WITH DRUG COATED BALLOON                     LABORIE (Bladder) Diagnosis:       Urgency of urination      (Urgency of urination [R39.15])    Surgeons: Sangita Marsh MD Responsible Provider: Kenan Kan MD    Anesthesia Type: general ASA Status: 3            Anesthesia Type: No value filed.    Flori Phase I: Flori Score: 10    Flori Phase II: Flori Score: 10    Anesthesia Post Evaluation    Patient location during evaluation: PACU  Patient participation: complete - patient participated  Level of consciousness: awake and alert  Pain score: 0  Airway patency: patent  Nausea & Vomiting: no nausea and no vomiting  Cardiovascular status: blood pressure returned to baseline  Respiratory status: acceptable  Hydration status: stable  Pain management: adequate    No notable events documented.

## (undated) DEVICE — SOLUTION IRRIG 2000ML STRL H2O UROMATIC PLAS CONT USP

## (undated) DEVICE — GOWN SIRUS NONREIN XL W/TWL: Brand: MEDLINE INDUSTRIES, INC.

## (undated) DEVICE — CYSTO: Brand: MEDLINE INDUSTRIES, INC.

## (undated) DEVICE — GUIDEWIRE ENDOSCP L150CM DIA0.035IN TIP 3CM PTFE NIT

## (undated) DEVICE — DRAINBAG,ANTI-REFLUX TOWER,L/F,2000ML,LL: Brand: MEDLINE

## (undated) DEVICE — SYRINGE MED 30ML STD CLR PLAS LUERLOCK TIP N CTRL DISP

## (undated) DEVICE — CATHETER URETH 30 FR 5 CM BLLN W/ INFLATION DRUG OPTILUME

## (undated) DEVICE — SOLUTION IV IRRIG WATER 1000ML POUR BRL 2F7114

## (undated) DEVICE — GLOVE ORANGE PI 7   MSG9070

## (undated) DEVICE — CATHETER URETH 24FR BLLN 30CC RED LTX 3 W F SPEC M RND TIP

## (undated) DEVICE — GLOVE SURG SZ 75 CRM LTX FREE POLYISOPRENE POLYMER BEAD ANTI

## (undated) DEVICE — SYRINGE MED 10ML LUERLOCK TIP W/O SFTY DISP

## (undated) DEVICE — SYRINGE,TOOMEY,IRRIGATION,70CC,STERILE: Brand: MEDLINE

## (undated) DEVICE — DALE FOLEY CATHETER HOLDER, LEGBAND, FITS UP TO 30": Brand: DALE FOLEY CATHETER HOLDER

## (undated) DEVICE — CYSTOSCOPY: Brand: MEDLINE INDUSTRIES, INC.

## (undated) DEVICE — SOLUTION IV IRRIG POUR BRL 0.9% SODIUM CHL 2F7124

## (undated) DEVICE — ELECTRODE ELECSURG LOOP MED 12 DEG BPLR QUICK-FIRE

## (undated) DEVICE — CATHETER URETH 12FR BLLN 5CC LTX HYDRGEL 2 W BARDX LUB F

## (undated) DEVICE — PAD DRY FLOOR ABS 32X58IN GRN